# Patient Record
Sex: FEMALE | Race: WHITE | NOT HISPANIC OR LATINO | Employment: FULL TIME | URBAN - METROPOLITAN AREA
[De-identification: names, ages, dates, MRNs, and addresses within clinical notes are randomized per-mention and may not be internally consistent; named-entity substitution may affect disease eponyms.]

---

## 2017-06-12 ENCOUNTER — COMMUNICATION - HEALTHEAST (OUTPATIENT)
Dept: FAMILY MEDICINE | Facility: CLINIC | Age: 37
End: 2017-06-12

## 2017-06-12 ENCOUNTER — AMBULATORY - HEALTHEAST (OUTPATIENT)
Dept: FAMILY MEDICINE | Facility: CLINIC | Age: 37
End: 2017-06-12

## 2017-06-12 ENCOUNTER — OFFICE VISIT - HEALTHEAST (OUTPATIENT)
Dept: FAMILY MEDICINE | Facility: CLINIC | Age: 37
End: 2017-06-12

## 2017-06-12 DIAGNOSIS — F32.1 MAJOR DEPRESSIVE DISORDER, SINGLE EPISODE, MODERATE (H): ICD-10-CM

## 2017-06-12 DIAGNOSIS — N76.0 VAGINITIS: ICD-10-CM

## 2017-06-12 DIAGNOSIS — Z72.0 TOBACCO ABUSE: ICD-10-CM

## 2018-03-12 ENCOUNTER — COMMUNICATION - HEALTHEAST (OUTPATIENT)
Dept: FAMILY MEDICINE | Facility: CLINIC | Age: 38
End: 2018-03-12

## 2018-05-26 ENCOUNTER — COMMUNICATION - HEALTHEAST (OUTPATIENT)
Dept: FAMILY MEDICINE | Facility: CLINIC | Age: 38
End: 2018-05-26

## 2018-05-26 DIAGNOSIS — F32.1 MAJOR DEPRESSIVE DISORDER, SINGLE EPISODE, MODERATE (H): ICD-10-CM

## 2018-06-14 ENCOUNTER — COMMUNICATION - HEALTHEAST (OUTPATIENT)
Dept: FAMILY MEDICINE | Facility: CLINIC | Age: 38
End: 2018-06-14

## 2018-08-02 ENCOUNTER — OFFICE VISIT - HEALTHEAST (OUTPATIENT)
Dept: FAMILY MEDICINE | Facility: CLINIC | Age: 38
End: 2018-08-02

## 2018-08-02 ENCOUNTER — COMMUNICATION - HEALTHEAST (OUTPATIENT)
Dept: FAMILY MEDICINE | Facility: CLINIC | Age: 38
End: 2018-08-02

## 2018-08-02 DIAGNOSIS — Z30.8 ENCOUNTER FOR OTHER CONTRACEPTIVE MANAGEMENT: ICD-10-CM

## 2018-08-02 DIAGNOSIS — Z72.0 TOBACCO ABUSE: ICD-10-CM

## 2018-08-02 ASSESSMENT — MIFFLIN-ST. JEOR: SCORE: 1411.62

## 2018-08-21 ENCOUNTER — RECORDS - HEALTHEAST (OUTPATIENT)
Dept: ADMINISTRATIVE | Facility: OTHER | Age: 38
End: 2018-08-21

## 2018-08-21 ENCOUNTER — OFFICE VISIT - HEALTHEAST (OUTPATIENT)
Dept: OBGYN | Facility: CLINIC | Age: 38
End: 2018-08-21

## 2018-08-21 DIAGNOSIS — Z30.09 STERILIZATION CONSULT: ICD-10-CM

## 2018-08-21 ASSESSMENT — MIFFLIN-ST. JEOR: SCORE: 1401.64

## 2018-09-08 ENCOUNTER — COMMUNICATION - HEALTHEAST (OUTPATIENT)
Dept: FAMILY MEDICINE | Facility: CLINIC | Age: 38
End: 2018-09-08

## 2018-09-08 DIAGNOSIS — Z72.0 TOBACCO ABUSE: ICD-10-CM

## 2018-09-08 DIAGNOSIS — F32.1 MAJOR DEPRESSIVE DISORDER, SINGLE EPISODE, MODERATE (H): ICD-10-CM

## 2018-09-24 ENCOUNTER — OFFICE VISIT - HEALTHEAST (OUTPATIENT)
Dept: FAMILY MEDICINE | Facility: CLINIC | Age: 38
End: 2018-09-24

## 2018-09-24 DIAGNOSIS — Z30.2 ENCOUNTER FOR STERILIZATION: ICD-10-CM

## 2018-09-24 DIAGNOSIS — Z01.818 PRE-OP EXAMINATION: ICD-10-CM

## 2018-09-24 DIAGNOSIS — F32.1 MODERATE MAJOR DEPRESSION (H): ICD-10-CM

## 2018-09-24 DIAGNOSIS — S61.459A HUMAN BITE OF HAND: ICD-10-CM

## 2018-09-24 DIAGNOSIS — Z72.0 TOBACCO ABUSE: ICD-10-CM
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
## 2018-09-24 LAB
HGB BLD-MCNC: 15.4 G/DL (ref 12–16)
TSH SERPL DL<=0.005 MIU/L-ACNC: 1.52 UIU/ML (ref 0.3–5)

## 2018-09-24 ASSESSMENT — MIFFLIN-ST. JEOR: SCORE: 1428.85

## 2018-09-26 ASSESSMENT — MIFFLIN-ST. JEOR: SCORE: 1428.85

## 2018-09-27 ENCOUNTER — ANESTHESIA - HEALTHEAST (OUTPATIENT)
Dept: SURGERY | Facility: AMBULATORY SURGERY CENTER | Age: 38
End: 2018-09-27

## 2018-09-28 ENCOUNTER — AMBULATORY - HEALTHEAST (OUTPATIENT)
Dept: OBGYN | Facility: CLINIC | Age: 38
End: 2018-09-28

## 2018-09-28 ENCOUNTER — SURGERY - HEALTHEAST (OUTPATIENT)
Dept: SURGERY | Facility: AMBULATORY SURGERY CENTER | Age: 38
End: 2018-09-28

## 2018-09-28 DIAGNOSIS — G89.18 ACUTE POST-OPERATIVE PAIN: ICD-10-CM

## 2018-09-28 ASSESSMENT — MIFFLIN-ST. JEOR: SCORE: 1428.85

## 2019-01-14 ENCOUNTER — COMMUNICATION - HEALTHEAST (OUTPATIENT)
Dept: FAMILY MEDICINE | Facility: CLINIC | Age: 39
End: 2019-01-14

## 2019-03-11 ENCOUNTER — RECORDS - HEALTHEAST (OUTPATIENT)
Dept: ADMINISTRATIVE | Facility: OTHER | Age: 39
End: 2019-03-11

## 2019-05-07 ENCOUNTER — COMMUNICATION - HEALTHEAST (OUTPATIENT)
Dept: SCHEDULING | Facility: CLINIC | Age: 39
End: 2019-05-07

## 2019-05-07 ENCOUNTER — RECORDS - HEALTHEAST (OUTPATIENT)
Dept: GENERAL RADIOLOGY | Facility: CLINIC | Age: 39
End: 2019-05-07

## 2019-05-07 ENCOUNTER — OFFICE VISIT - HEALTHEAST (OUTPATIENT)
Dept: FAMILY MEDICINE | Facility: CLINIC | Age: 39
End: 2019-05-07

## 2019-05-07 DIAGNOSIS — S99.911A UNSPECIFIED INJURY OF RIGHT ANKLE, INITIAL ENCOUNTER: ICD-10-CM

## 2019-05-07 DIAGNOSIS — S99.911A ANKLE INJURY, RIGHT, INITIAL ENCOUNTER: ICD-10-CM

## 2019-05-08 ENCOUNTER — COMMUNICATION - HEALTHEAST (OUTPATIENT)
Dept: FAMILY MEDICINE | Facility: CLINIC | Age: 39
End: 2019-05-08

## 2019-07-27 ENCOUNTER — COMMUNICATION - HEALTHEAST (OUTPATIENT)
Dept: FAMILY MEDICINE | Facility: CLINIC | Age: 39
End: 2019-07-27

## 2019-07-27 DIAGNOSIS — F32.1 MODERATE MAJOR DEPRESSION (H): ICD-10-CM

## 2019-09-05 ENCOUNTER — RECORDS - HEALTHEAST (OUTPATIENT)
Dept: ADMINISTRATIVE | Facility: OTHER | Age: 39
End: 2019-09-05

## 2019-09-12 ENCOUNTER — RECORDS - HEALTHEAST (OUTPATIENT)
Dept: ADMINISTRATIVE | Facility: OTHER | Age: 39
End: 2019-09-12

## 2019-09-13 ENCOUNTER — COMMUNICATION - HEALTHEAST (OUTPATIENT)
Dept: FAMILY MEDICINE | Facility: CLINIC | Age: 39
End: 2019-09-13

## 2019-10-22 ENCOUNTER — AMBULATORY - HEALTHEAST (OUTPATIENT)
Dept: FAMILY MEDICINE | Facility: CLINIC | Age: 39
End: 2019-10-22

## 2019-10-22 ENCOUNTER — COMMUNICATION - HEALTHEAST (OUTPATIENT)
Dept: FAMILY MEDICINE | Facility: CLINIC | Age: 39
End: 2019-10-22

## 2019-10-22 ENCOUNTER — RECORDS - HEALTHEAST (OUTPATIENT)
Dept: SCHEDULING | Facility: CLINIC | Age: 39
End: 2019-10-22

## 2019-10-22 DIAGNOSIS — Z28.39 INCOMPLETE IMMUNIZATION STATUS: ICD-10-CM

## 2020-02-24 ENCOUNTER — OFFICE VISIT - HEALTHEAST (OUTPATIENT)
Dept: FAMILY MEDICINE | Facility: CLINIC | Age: 40
End: 2020-02-24

## 2020-02-24 DIAGNOSIS — Z00.00 ENCOUNTER FOR GENERAL ADULT MEDICAL EXAMINATION WITHOUT ABNORMAL FINDINGS: ICD-10-CM

## 2020-02-24 DIAGNOSIS — I10 BENIGN ESSENTIAL HYPERTENSION: ICD-10-CM

## 2020-02-24 DIAGNOSIS — F32.1 MODERATE MAJOR DEPRESSION (H): ICD-10-CM

## 2020-02-24 DIAGNOSIS — Z13.220 ENCOUNTER FOR SCREENING FOR LIPOID DISORDERS: ICD-10-CM

## 2020-02-24 LAB
ALBUMIN SERPL-MCNC: 4.2 G/DL (ref 3.5–5)
ALP SERPL-CCNC: 78 U/L (ref 45–120)
ALT SERPL W P-5'-P-CCNC: 16 U/L (ref 0–45)
ANION GAP SERPL CALCULATED.3IONS-SCNC: 12 MMOL/L (ref 5–18)
AST SERPL W P-5'-P-CCNC: 16 U/L (ref 0–40)
BILIRUB SERPL-MCNC: 0.5 MG/DL (ref 0–1)
BUN SERPL-MCNC: 9 MG/DL (ref 8–22)
CALCIUM SERPL-MCNC: 9.4 MG/DL (ref 8.5–10.5)
CHLORIDE BLD-SCNC: 103 MMOL/L (ref 98–107)
CHOLEST SERPL-MCNC: 200 MG/DL
CO2 SERPL-SCNC: 24 MMOL/L (ref 22–31)
CREAT SERPL-MCNC: 0.75 MG/DL (ref 0.6–1.1)
FASTING STATUS PATIENT QL REPORTED: YES
GFR SERPL CREATININE-BSD FRML MDRD: >60 ML/MIN/1.73M2
GLUCOSE BLD-MCNC: 80 MG/DL (ref 70–125)
HDLC SERPL-MCNC: 72 MG/DL
HGB BLD-MCNC: 14.8 G/DL (ref 12–16)
LDLC SERPL CALC-MCNC: 115 MG/DL
POTASSIUM BLD-SCNC: 4 MMOL/L (ref 3.5–5)
PROT SERPL-MCNC: 7.5 G/DL (ref 6–8)
SODIUM SERPL-SCNC: 139 MMOL/L (ref 136–145)
TRIGL SERPL-MCNC: 66 MG/DL
TSH SERPL DL<=0.005 MIU/L-ACNC: 1.91 UIU/ML (ref 0.3–5)

## 2020-02-24 RX ORDER — LISINOPRIL 10 MG/1
10 TABLET ORAL DAILY
Qty: 90 TABLET | Refills: 3 | Status: SHIPPED | OUTPATIENT
Start: 2020-02-24 | End: 2021-12-07

## 2020-02-24 ASSESSMENT — ANXIETY QUESTIONNAIRES
3. WORRYING TOO MUCH ABOUT DIFFERENT THINGS: NEARLY EVERY DAY
2. NOT BEING ABLE TO STOP OR CONTROL WORRYING: MORE THAN HALF THE DAYS
5. BEING SO RESTLESS THAT IT IS HARD TO SIT STILL: MORE THAN HALF THE DAYS
4. TROUBLE RELAXING: NEARLY EVERY DAY
6. BECOMING EASILY ANNOYED OR IRRITABLE: NEARLY EVERY DAY
IF YOU CHECKED OFF ANY PROBLEMS ON THIS QUESTIONNAIRE, HOW DIFFICULT HAVE THESE PROBLEMS MADE IT FOR YOU TO DO YOUR WORK, TAKE CARE OF THINGS AT HOME, OR GET ALONG WITH OTHER PEOPLE: EXTREMELY DIFFICULT
GAD7 TOTAL SCORE: 18
7. FEELING AFRAID AS IF SOMETHING AWFUL MIGHT HAPPEN: MORE THAN HALF THE DAYS
1. FEELING NERVOUS, ANXIOUS, OR ON EDGE: NEARLY EVERY DAY

## 2020-02-24 ASSESSMENT — MIFFLIN-ST. JEOR: SCORE: 1460.03

## 2020-02-24 ASSESSMENT — PATIENT HEALTH QUESTIONNAIRE - PHQ9: SUM OF ALL RESPONSES TO PHQ QUESTIONS 1-9: 24

## 2020-02-25 LAB
25(OH)D3 SERPL-MCNC: 31.3 NG/ML (ref 30–80)
25(OH)D3 SERPL-MCNC: 31.3 NG/ML (ref 30–80)

## 2020-02-26 LAB
BKR LAB AP ABNORMAL BLEEDING: NO
BKR LAB AP BIRTH CONTROL/HORMONES: NORMAL
BKR LAB AP CERVICAL APPEARANCE: NORMAL
BKR LAB AP GYN ADEQUACY: NORMAL
BKR LAB AP GYN INTERPRETATION: NORMAL
BKR LAB AP GYN OTHER FINDINGS: NORMAL
BKR LAB AP HPV REFLEX: NORMAL
BKR LAB AP LMP: NORMAL
BKR LAB AP PATIENT STATUS: NORMAL
BKR LAB AP PREVIOUS ABNORMAL: NORMAL
BKR LAB AP PREVIOUS NORMAL: NORMAL
HIGH RISK?: NO
PATH REPORT.COMMENTS IMP SPEC: NORMAL
RESULT FLAG (HE HISTORICAL CONVERSION): NORMAL

## 2020-03-18 ENCOUNTER — COMMUNICATION - HEALTHEAST (OUTPATIENT)
Dept: FAMILY MEDICINE | Facility: CLINIC | Age: 40
End: 2020-03-18

## 2020-03-25 ENCOUNTER — COMMUNICATION - HEALTHEAST (OUTPATIENT)
Dept: INTERNAL MEDICINE | Facility: CLINIC | Age: 40
End: 2020-03-25

## 2020-03-25 DIAGNOSIS — F32.1 MODERATE MAJOR DEPRESSION (H): ICD-10-CM

## 2020-03-27 ENCOUNTER — RECORDS - HEALTHEAST (OUTPATIENT)
Dept: ADMINISTRATIVE | Facility: OTHER | Age: 40
End: 2020-03-27

## 2020-03-30 ENCOUNTER — OFFICE VISIT - HEALTHEAST (OUTPATIENT)
Dept: FAMILY MEDICINE | Facility: CLINIC | Age: 40
End: 2020-03-30

## 2020-03-30 DIAGNOSIS — F32.1 MODERATE MAJOR DEPRESSION (H): ICD-10-CM

## 2020-03-30 DIAGNOSIS — F33.1 MODERATE EPISODE OF RECURRENT MAJOR DEPRESSIVE DISORDER (H): ICD-10-CM

## 2020-03-30 DIAGNOSIS — I10 BENIGN ESSENTIAL HYPERTENSION: ICD-10-CM

## 2020-03-30 ASSESSMENT — ANXIETY QUESTIONNAIRES
5. BEING SO RESTLESS THAT IT IS HARD TO SIT STILL: NOT AT ALL
2. NOT BEING ABLE TO STOP OR CONTROL WORRYING: NOT AT ALL
3. WORRYING TOO MUCH ABOUT DIFFERENT THINGS: SEVERAL DAYS
6. BECOMING EASILY ANNOYED OR IRRITABLE: NOT AT ALL
GAD7 TOTAL SCORE: 3
7. FEELING AFRAID AS IF SOMETHING AWFUL MIGHT HAPPEN: SEVERAL DAYS
IF YOU CHECKED OFF ANY PROBLEMS ON THIS QUESTIONNAIRE, HOW DIFFICULT HAVE THESE PROBLEMS MADE IT FOR YOU TO DO YOUR WORK, TAKE CARE OF THINGS AT HOME, OR GET ALONG WITH OTHER PEOPLE: SOMEWHAT DIFFICULT
1. FEELING NERVOUS, ANXIOUS, OR ON EDGE: SEVERAL DAYS
4. TROUBLE RELAXING: NOT AT ALL

## 2020-03-30 ASSESSMENT — PATIENT HEALTH QUESTIONNAIRE - PHQ9: SUM OF ALL RESPONSES TO PHQ QUESTIONS 1-9: 4

## 2020-05-02 ENCOUNTER — COMMUNICATION - HEALTHEAST (OUTPATIENT)
Dept: FAMILY MEDICINE | Facility: CLINIC | Age: 40
End: 2020-05-02

## 2020-05-02 DIAGNOSIS — F32.1 MODERATE MAJOR DEPRESSION (H): ICD-10-CM

## 2021-02-25 ENCOUNTER — OFFICE VISIT - HEALTHEAST (OUTPATIENT)
Dept: FAMILY MEDICINE | Facility: CLINIC | Age: 41
End: 2021-02-25

## 2021-02-25 DIAGNOSIS — R35.0 URINARY FREQUENCY: ICD-10-CM

## 2021-02-25 LAB
ALBUMIN UR-MCNC: NEGATIVE MG/DL
APPEARANCE UR: CLEAR
BACTERIA #/AREA URNS HPF: ABNORMAL HPF
BILIRUB UR QL STRIP: NEGATIVE
COLOR UR AUTO: YELLOW
GLUCOSE UR STRIP-MCNC: NEGATIVE MG/DL
HGB UR QL STRIP: ABNORMAL
KETONES UR STRIP-MCNC: NEGATIVE MG/DL
LEUKOCYTE ESTERASE UR QL STRIP: ABNORMAL
NITRATE UR QL: NEGATIVE
PH UR STRIP: 5.5 [PH] (ref 5–8)
RBC #/AREA URNS AUTO: ABNORMAL HPF
SP GR UR STRIP: 1.02 (ref 1–1.03)
SQUAMOUS #/AREA URNS AUTO: ABNORMAL LPF
UROBILINOGEN UR STRIP-ACNC: ABNORMAL
WBC #/AREA URNS AUTO: ABNORMAL HPF
WBC CLUMPS #/AREA URNS HPF: PRESENT /[HPF]

## 2021-02-27 LAB — BACTERIA SPEC CULT: ABNORMAL

## 2021-03-01 ENCOUNTER — COMMUNICATION - HEALTHEAST (OUTPATIENT)
Dept: FAMILY MEDICINE | Facility: CLINIC | Age: 41
End: 2021-03-01

## 2021-04-07 ENCOUNTER — COMMUNICATION - HEALTHEAST (OUTPATIENT)
Dept: SCHEDULING | Facility: CLINIC | Age: 41
End: 2021-04-07

## 2021-05-27 ASSESSMENT — PATIENT HEALTH QUESTIONNAIRE - PHQ9
SUM OF ALL RESPONSES TO PHQ QUESTIONS 1-9: 24
SUM OF ALL RESPONSES TO PHQ QUESTIONS 1-9: 4

## 2021-05-28 ASSESSMENT — ANXIETY QUESTIONNAIRES
GAD7 TOTAL SCORE: 3
GAD7 TOTAL SCORE: 18

## 2021-05-28 NOTE — PROGRESS NOTES
Assessment:   1. Ankle injury, right, initial encounter  Likely secondary to  Ankle sprain of the right lateral malleolus   - XR Ankle Right 3 or More VWS; Future    Plan:   I discussed the following treatment options: Exercise options discussed and encouraged  Activity modifications discussed and recommended  Continue with existing conservative treatment program  Weight bearing restriction: None  Questions solicited and answered  ACE rap applied.      Subjective:    Elisabeth Carranza is a 39 y.o. female presents for evaluation and treatment of an injury to the right ankle. This is evaluated as a personal injury. The injury occurred 1 day ago, and occurred while playing softball.  The patient states the ankle rolled inward at the time of injury.  She did not hear or sense a pop or snap at the time of the injury. The patient notes pain and moderate swelling of the ankle since the injury. She has treated the ankle with ice. Pain is localized to the lateral  malleolar area. She has not sprained this ankle in the past.    Outside reports reviewed: None.    The following portions of the patient's history were reviewed and updated as appropriate: allergies, current medications, past family history, past medical history, past social history, past surgical history and problem list.      Objective:      General :    alert, appears stated age and cooperative   Gait:  Normal. The patient can bear weight on the injured extremity.   Right Ankle  Proximal Fibula:   no tenderness noted   Edema:   moderate swelling of the medial, lateral surface   Ecchymosis:   is not observed    Active ROM:  50% of normal    Passive ROM:   50% of normal    Palpation:  moderate tenderness of the anterior, medial surface   Stability.:   no joint laxity. Drawer sign equal to unaffected ankle.   Sensation:    intact to light touch   Pulses:  normal DP and PT pulses     Imaging  X-ray of the ankle/foot: 3 views of the ankle reveal a stable mortise joint,  moderate edema and no evidence of fracture.  I have independently review and interpreted the  imaging, pending the final radiology read.

## 2021-05-28 NOTE — TELEPHONE ENCOUNTER
CC: R ankle pain      > Playing softball last night - fell over 1st base     > No pop/snap heard     > yes ankle swelling   > Is able to walk but painful to do so        At home   ICE and IBU and ankle brace       A/P:   > Appt for today     > Rec RICE per guidelines - at night,  suitcase between mattress and box spring        Olegario Guzman, RN   Triage and Medication Refills          Reason for Disposition    SEVERE pain (e.g., excruciating)    Protocols used: ANKLE AND FOOT INJURY-A-OH

## 2021-05-30 ENCOUNTER — RECORDS - HEALTHEAST (OUTPATIENT)
Dept: ADMINISTRATIVE | Facility: CLINIC | Age: 41
End: 2021-05-30

## 2021-05-30 NOTE — TELEPHONE ENCOUNTER
Refill Given    Refill given per Policy, patient informed they are overdue for Labs   OV 9/24/18    Radha Shearer, [facility] Triage/Med Refill 7/27/2019    Requested Prescriptions   Pending Prescriptions Disp Refills     venlafaxine (EFFEXOR-XR) 75 MG 24 hr capsule [Pharmacy Med Name: VENLAFAXINE HCL ER 75MG CP24] 90 capsule 1     Sig: TAKE ONE CAPSULE BY MOUTH EVERY DAY       Venlafaxine/Desvenlafaxine Refill Protocol Failed - 7/27/2019  8:40 PM        Failed - LFT or AST or ALT in last year     Albumin   Date Value Ref Range Status   06/20/2011 4.0 3.5 - 5.0 g/dL Final     Bilirubin, Total   Date Value Ref Range Status   06/20/2011 0.2 <1.1 mg/dL Final     Alkaline Phosphatase   Date Value Ref Range Status   06/20/2011 118 45 - 120 IU/L Final     AST   Date Value Ref Range Status   06/20/2011 15 <41 IU/L Final     ALT   Date Value Ref Range Status   06/20/2011 13 <46 IU/L Final     Protein, Total   Date Value Ref Range Status   06/20/2011 7.4 6.0 - 8.0 g/dL Final                Failed - Fasting lipid cascade in last year     Cholesterol   Date Value Ref Range Status   10/21/2016 158 <=199 mg/dL Final     Triglycerides   Date Value Ref Range Status   10/21/2016 63 <=149 mg/dL Final     HDL Cholesterol   Date Value Ref Range Status   10/21/2016 52 >=50 mg/dL Final     LDL Calculated   Date Value Ref Range Status   10/21/2016 93 <=129 mg/dL Final     Patient Fasting > 8hrs?   Date Value Ref Range Status   10/21/2016 Yes  Final             Passed - PCP or prescribing provider visit in last year     Last office visit with prescriber/PCP: 6/12/2017 Elisabeth Arita MD OR same dept: 8/2/2018 Becca Terry MD OR same specialty: 5/7/2019 Leena Dawson, DICK  Last physical: 9/24/2018 Last MTM visit: Visit date not found   Next visit within 3 mo: Visit date not found  Next physical within 3 mo: Visit date not found  Prescriber OR PCP: Elisabeth Arita MD  Last diagnosis associated with med order: There are no  diagnoses linked to this encounter.  If protocol passes may refill for 12 months if within 3 months of last provider visit (or a total of 15 months).             Passed - Blood Pressure in last year     BP Readings from Last 1 Encounters:   05/07/19 133/78

## 2021-05-31 VITALS — BODY MASS INDEX: 30.66 KG/M2 | WEIGHT: 173.1 LBS

## 2021-06-01 VITALS — WEIGHT: 167 LBS | HEIGHT: 63 IN | BODY MASS INDEX: 29.59 KG/M2

## 2021-06-01 VITALS — BODY MASS INDEX: 29.98 KG/M2 | WEIGHT: 169.2 LBS | HEIGHT: 63 IN

## 2021-06-02 ENCOUNTER — RECORDS - HEALTHEAST (OUTPATIENT)
Dept: ADMINISTRATIVE | Facility: CLINIC | Age: 41
End: 2021-06-02

## 2021-06-02 VITALS — HEIGHT: 63 IN | WEIGHT: 173 LBS | BODY MASS INDEX: 30.65 KG/M2

## 2021-06-02 NOTE — TELEPHONE ENCOUNTER
Left message to call back for: MMR  Information to relay to patient:  LMTCB. Dr. Mcgarry has ordered both Rubeola titer and MMR vaccine.  Please schedule lab appt if patient would like MMR titer done or nurse appt if she would just like to have MMR vaccine.

## 2021-06-02 NOTE — TELEPHONE ENCOUNTER
Patient called back to state that she was coming in for an MMR vaccination.  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  Spoke with assistant at [facility]. Patient's PCP is no longer with [facility] and patient has not been seen for 1 year at [facility]. A doctor needs to sign off on the order.  Clinic will have Dr. Mcgarry review. Patient may need a titer first.  Clinic will call patient back.  She can be reached at [phone] detailed message can be left.  Thank you.  Olga Mays, RN  [facility] Triage Nurse  3:00 PM  10/22/2019

## 2021-06-02 NOTE — TELEPHONE ENCOUNTER
Called and left a message to call back. Patient is coming in today for an MMR and does not have orders- immunization or blood draw. I am wondering why patient is requesting MMR now. Usually providers check MMR status with a titer (blood draw).

## 2021-06-03 VITALS — BODY MASS INDEX: 30.65 KG/M2 | WEIGHT: 173 LBS

## 2021-06-04 VITALS
DIASTOLIC BLOOD PRESSURE: 96 MMHG | WEIGHT: 176.38 LBS | BODY MASS INDEX: 30.11 KG/M2 | OXYGEN SATURATION: 99 % | SYSTOLIC BLOOD PRESSURE: 144 MMHG | HEIGHT: 64 IN | HEART RATE: 81 BPM

## 2021-06-05 ENCOUNTER — RECORDS - HEALTHEAST (OUTPATIENT)
Dept: ULTRASOUND IMAGING | Facility: CLINIC | Age: 41
End: 2021-06-05

## 2021-06-05 VITALS
SYSTOLIC BLOOD PRESSURE: 128 MMHG | BODY MASS INDEX: 34.08 KG/M2 | WEIGHT: 198.56 LBS | OXYGEN SATURATION: 99 % | DIASTOLIC BLOOD PRESSURE: 91 MMHG | HEART RATE: 97 BPM

## 2021-06-05 DIAGNOSIS — Z33.1 PREGNANT STATE, INCIDENTAL: ICD-10-CM

## 2021-06-05 DIAGNOSIS — Z36.89 ENCOUNTER FOR FETAL ANATOMIC SURVEY: ICD-10-CM

## 2021-06-06 NOTE — TELEPHONE ENCOUNTER
LM for patient to return call to clinic.  Per [facility] ([facility]) guidelines, due to Covid-19, it is recommended that the patient not come into clinic unless it is an urgent need.  It is recommended to schedule a telephone visit with the provider.  Please assist in setting up the telephone appointment or reschedule in clinic after July 6, 2020. Thank you

## 2021-06-07 NOTE — TELEPHONE ENCOUNTER
Refill Approved    Rx renewed per Medication Renewal Policy. Medication was last renewed on 3/30/20.    Dariela Stockton, [facility] Triage/Med Refill 5/4/2020     Requested Prescriptions   Pending Prescriptions Disp Refills     venlafaxine (EFFEXOR-XR) 150 MG 24 hr capsule 90 capsule 0     Sig: Take 1 capsule (150 mg total) by mouth daily.       Venlafaxine/Desvenlafaxine Refill Protocol Passed - 5/2/2020 12:33 PM        Passed - LFT or AST or ALT in last year     Albumin   Date Value Ref Range Status   02/24/2020 4.2 3.5 - 5.0 g/dL Final     Bilirubin, Total   Date Value Ref Range Status   02/24/2020 0.5 0.0 - 1.0 mg/dL Final     Alkaline Phosphatase   Date Value Ref Range Status   02/24/2020 78 45 - 120 U/L Final     AST   Date Value Ref Range Status   02/24/2020 16 0 - 40 U/L Final     ALT   Date Value Ref Range Status   02/24/2020 16 0 - 45 U/L Final     Protein, Total   Date Value Ref Range Status   02/24/2020 7.5 6.0 - 8.0 g/dL Final                Passed - Fasting lipid cascade in last year     Cholesterol   Date Value Ref Range Status   02/24/2020 200 (H) <=199 mg/dL Final     Triglycerides   Date Value Ref Range Status   02/24/2020 66 <=149 mg/dL Final     HDL Cholesterol   Date Value Ref Range Status   02/24/2020 72 >=50 mg/dL Final     LDL Calculated   Date Value Ref Range Status   02/24/2020 115 <=129 mg/dL Final     Patient Fasting > 8hrs?   Date Value Ref Range Status   02/24/2020 Yes  Final             Passed - PCP or prescribing provider visit in last year     Last office visit with prescriber/PCP: 5/7/2019 Leena Dawson FNP OR same dept: 5/7/2019 Leena Dawson FNP OR same specialty: 5/7/2019 Leena Dawson FNP  Last physical: 2/24/2020 Last MTM visit: Visit date not found   Next visit within 3 mo: Visit date not found  Next physical within 3 mo: Visit date not found  Prescriber OR PCP: RADHA Moyer  Last diagnosis associated with med order: 1. Moderate major depression  (H)  - venlafaxine (EFFEXOR-XR) 150 MG 24 hr capsule; Take 1 capsule (150 mg total) by mouth daily.  Dispense: 90 capsule; Refill: 0    If protocol passes may refill for 12 months if within 3 months of last provider visit (or a total of 15 months).             Passed - Blood Pressure in last year     BP Readings from Last 1 Encounters:   02/24/20 (!) 144/96

## 2021-06-07 NOTE — PROGRESS NOTES
"Emily Vidales is a 40 y.o. female who is being evaluated via a billable telephone visit.      The patient has been notified of following:     \"This telephone visit will be conducted via a call between you and your physician/provider. We have found that certain health care needs can be provided without the need for a physical exam.  This service lets us provide the care you need with a short phone conversation.  If a prescription is necessary we can send it directly to your pharmacy.  If lab work is needed we can place an order for that and you can then stop by our lab to have the test done at a later time.    If during the course of the call the physician/provider feels a telephone visit is not appropriate, you will not be charged for this service.\"     Patient has given verbal consent to a Telephone visit? Yes    Emily Vidales complains of  No chief complaint on file.      I have reviewed and updated the patient's Past Medical History, Social History, Family History and Medication List.    ALLERGIES  Patient has no known allergies.    Additional provider notes:     Subjective:   Emily Vidales is a 40 y.o. female whom I evaluated for follow up of depression and hypertension. Patient reports that she is in a better place now and she can focus better since starting her medication. She reports that things are going well. Symptoms have been rapidly improving since that time. Patient denies insomnia, psychomotor agitation, psychomotor retardation, recurrent thoughts of death, suicidal attempt, suicidal thoughts with specific plan, suicidal thoughts without plan, weight gain and weight loss. Previous treatment includes: medication. She complains of the following side effects from the treatment: none. Patient also reports that her blood pressure has normalized since starting lisinopril. She denied any adverse effect from the medication.     The following portions of the patient's history were reviewed and updated as " appropriate: allergies, current medications, past family history, past medical history, past social history, past surgical history and problem list.    Assessment/Plan:  1. Benign essential hypertension  Blood pressure is well controlled and meeting goal of <140/90 mm Hg per JNC-8 hypertension guidelines.    Continue with lisinopril 10 mg daily.     2. Moderate episode of recurrent major depressive disorder (H)  Improved symptom of depression and anxiety.   Medications: Effexor.  Reviewed concept of anxiety as biochemical imbalance of neurotransmitters and rationale for treatment.  Instructed patient to contact office or on-call physician promptly should condition worsen or any new symptoms appear and provided on-call telephone numbers. IF THE PATIENT HAS ANY SUICIDAL OR HOMICIDAL IDEATIONS, CALL THE OFFICE, DISCUSS WITH A SUPPORT MEMBER, OR GO TO THE ER IMMEDIATELY. Patient was agreeable with this plan.  Follow up: 4 weeks.  Spent 15 minutes (>50% of visit) discussing the risks of depression, the  pathophysiology, etiology, risks, and principles of treatment.    Phone call duration:  15 minutes    RADHA Moyer     Breath sounds clear and equal bilaterally.

## 2021-06-11 NOTE — PROGRESS NOTES
ASSESSMENT/PLAN  1. Vaginitis  Reviewed with patient that we will contact her regarding wet prep results when available.  Anticipate may see bacterial vaginosis and/or yeast--- if confirmed, will send appropriate Rx to pharmacy.  Reviewed option of additionally trying over-the-counter product such as Summer's Sandra feminine wash if desired.  Patient counseled to avoid any use of a douche or similar product.  - Wet Prep, Vaginal    2. Major depressive disorder, single episode, moderate  Poorly controlled depression without suicidality.  Patient is seeking counseling and I did give her the names of a couple counselor she could pursue including Jason Barnett.  She does not think she needs a referral.  At this time, we will increase from 20-->40 mg of citalopram and she will update me via my chart in approximately 3 weeks.  If this is not improving symptoms, would recommend that we do a formal consult for evaluation of bipolar given her description of mood changes.  She is in agreement.  - citalopram (CELEXA) 40 MG tablet; TAKE 1 TABLET BY MOUTH DAILY  Dispense: 90 tablet; Refill: 1    3. Tobacco abuse  Patient continues to smoke and we discussed the importance of cessation.  She is aware of the health risks but feels unprepared to quit at this time with depression symptoms uncontrolled.    The following are part of a depression follow up plan for the patient:  seen in mental health clinic and patient follow-up to return when and if necessary  The following high BMI interventions were performed this visit: encouragement to exercise and weight monitoring----reviewed importance of appropriate food choices and portion control as well.    Pt states an understanding and agrees to the above plan.          SUBJECTIVE:   Chief Complaint   Patient presents with     Vaginitis     vaginal fishy odor with unusal discharge for about 6 months-      Follow-up     PHQ-9  Celexa current dose not working     Elisabeth Carranza 37 y.o.  "female    Current Outpatient Prescriptions   Medication Sig Dispense Refill     citalopram (CELEXA) 40 MG tablet TAKE 1 TABLET BY MOUTH DAILY 90 tablet 1     MV/FA/DHA/EPA/COQ10/CA/D3/CRAN (WOMEN'S VITAPAK ORAL) Take by mouth.       No current facility-administered medications for this visit.      Allergies: Review of patient's allergies indicates no known allergies.   Patient's last menstrual period was 05/15/2017.    HPI:   Emily is a 37-year-old female comes in today for concern regarding vaginal infection.  She has had symptoms for approximately 6 months now of increasing discharge with a \"fishy\" odor.  She states she is sexually active but only with her .  They have been having significant marital stressors and are not sexually active frequently at this point.  She is not concerned about STI.  She denies any significant itching or irritation.  There is no dysuria or urinary frequency.  No dyspareunia.  Review of chart shows normal Pap with negative HPV in 2 2014.  Of note, there was evidence for shift in caleb consistent with bacterial vaginosis on the Pap.  Emliy has a history of depression.  She is on citalopram at 20 mg.  She just refilled her prescription but feels that it is not working.  She struggles with fluctuating mood and states she will have several days where she will force herself to do things with the kids work etc. but would prefer to just go to bed and sleep.  This will then lifted she will have days with better energy.  She does not describe being up all night, problematic spending or drinking etc.  She does feel she becomes easily tearful and emotional.  There is no significant family history for depression anxiety or other mental health issues--- she specifically denies any history of bipolar but admits she is thought about it.  She reports compliance with her current medication and no side effects.  She does continue smoking and admits she does this more when she feels stressed.  She " denies any other illicit drug use.    ROS: negative except as per HPI    OBJECTIVE:   The patient appears well, alert, oriented x 3, in no distress.  /80  Pulse 60  Wt 173 lb 1.6 oz (78.5 kg)  LMP 05/15/2017  Breastfeeding? No  BMI 30.66 kg/m2    : Normal external female exam.  Speculum isolate cervix with no gross abnormalities but large amount of thick mucus--- yellow/white--in the vaginal vault.  No obvious odor.  Bimanual exam shows no cervical motion tenderness or adnexal tenderness or masses.    Little interest or pleasure in doing things: More than half the days  Feeling down, depressed, or hopeless: More than half the days  Trouble falling or staying asleep, or sleeping too much: Nearly every day  Feeling tired or having little energy: Nearly every day  Poor appetite or overeating: Nearly every day  Feeling bad about yourself - or that you are a failure or have let yourself or your family down: Nearly every day  Trouble concentrating on things, such as reading the newspaper or watching television: More than half the days  Moving or speaking so slowly that other people could have noticed. Or the opposite - being so fidgety or restless that you have been moving around a lot more than usual: More than half the days  Thoughts that you would be better off dead, or of hurting yourself in some way: Not at all  PHQ-9 Total Score: 20  If you checked off any problems, how difficult have these problems made it for you to do your work, take care of things at home, or get along with other people?: Very difficult

## 2021-06-15 NOTE — PROGRESS NOTES
Assessment:   1. Urinary frequency  Discussed diagnosis of urinary tract infection and treatment. Recommend a different antibiotic from the last one and obtain urine culture.   - Urinalysis-UC if Indicated  - Culture, Urine  - sulfamethoxazole-trimethoprim (BACTRIM DS) 800-160 mg per tablet; Take 1 tablet by mouth 2 (two) times a day for 10 days.  Dispense: 10 tablet; Refill: 0     Plan:  Plan:   1. Medications: TMP/SMX  2. Maintain adequate hydration  3. Follow up if symptoms not improving, and prn.     Subjective:   Emily Vidales is a 41 y.o. female who complains of dysuria, urgency and cramping for 4 days.  Patient also complains of lower abdominal pain. Patient denies back pain, congestion, cough, fever, headache, rhinitis, sorethroat and vaginal discharge.  Patient was treated for UTI three weeks ago. Patient does have a history of recurrent UTI.  Patient does not have a history of pyelonephritis.  The following portions of the patient's history were reviewed and updated as appropriate: allergies, current medications, past family history, past medical history, past social history, past surgical history and problem list.  Review of Systems  A 12 point comprehensive review of systems was negative except as noted.      Objective:      BP (!) 136/95   Pulse 97   Wt 198 lb 9 oz (90.1 kg)   SpO2 99%   BMI 34.08 kg/m    General: alert, appears stated age and cooperative   Abdomen: soft, non-tender, without masses or organomegaly and tenderness moderate in the periumbilical area    Back: CVA tenderness absent   : defer exam     Laboratory:   Urine dipstick shows 1+ for leukocyte esterase.    Micro exam: + WBCs per HPF.

## 2021-06-16 PROBLEM — I10 BENIGN ESSENTIAL HYPERTENSION: Status: ACTIVE | Noted: 2020-03-30

## 2021-06-16 PROBLEM — F33.9 RECURRENT MAJOR DEPRESSIVE DISORDER (H): Status: ACTIVE | Noted: 2020-03-30

## 2021-06-16 NOTE — TELEPHONE ENCOUNTER
Pt called stating he Childrens father tested postive today for covid 19 and her children was with him last Sunday, she is wandering what she should do?    RN advised pt her children should quarantine for 14 days, and if she would like they can get tested. Mom stated she will have them tested. RN advised they shouldn't go to school should me home quarantine, and mom stated okay.      Reggie Murrell RN  [facility]      COVID 19 Nurse Triage Plan/Patient Instructions    Please be aware that novel coronavirus (COVID-19) may be circulating in the community. If you develop symptoms such as fever, cough, or SOB or if you have concerns about the presence of another infection including coronavirus (COVID-19), please contact your health care provider or visit  [email or web address].    Disposition/Instructions    Home care recommended. Follow home care protocol based instructions.    Thank you for taking steps to prevent the spread of this virus.  o Limit your contact with others.  o Wear a simple mask to cover your cough.  o Wash your hands well and often.    Resources    [facility]: About COVID-19: [email or web address]    CDC: What to Do If You're Sick: www.cdc.gov/coronavirus/2019-ncov/about/steps-when-sick.html    CDC: Ending Home Isolation: www.cdc.gov/coronavirus/2019-ncov/hcp/disposition-in-home-patients.html     CDC: Caring for Someone: www.cdc.gov/coronavirus/2019-ncov/if-you-are-sick/care-for-someone.html     [facility]: Interim Guidance for Hospital Discharge to Home: www.health.[facility].[email or web address]/diseases/coronavirus/hcp/hospdischarge.pdf    [facility] clinical trials (COVID-19 research studies): [email or web address]/um-[email or web address]-trials     Below are the COVID-19 hotlines at the [facility] ([facility]). Interpreters are available.   o For health questions: Call 741-327-0618 or 1-539.885.8730 (7 a.m. to 7 p.m.)  o For questions about schools and  childcare: Call 408-551-2789 or 1-111.624.4564 (7 a.m. to 7 p.m.)         Reason for Disposition    COVID-19 Home Isolation, questions about    Protocols used: CORONAVIRUS (COVID-19) DIAGNOSED OR [id number] 1.3.21

## 2021-06-18 NOTE — PATIENT INSTRUCTIONS - HE
Patient Instructions by Leena Dawson FNP at 2/24/2020  3:10 PM     Author: Leena Dawson FNP Service: -- Author Type: Nurse Practitioner    Filed: 2/24/2020  4:14 PM Encounter Date: 2/24/2020 Status: Signed    : Leena Dawson FNP (Nurse Practitioner)           Preventing Skin Cancer     Use sunscreen of SPF 30 or greater. Apply liberally.   Relaxing in the sun may feel good. But it isnt good for your skin. In fact, the suns harmful rays are the major cause of skin cancer. This is a serious disease that can be life-threatening. People of all ages, races, and backgrounds are at risk.  Skin cancer is the most common cancer in the [place] But in most cases, it can be prevented.  Your role in prevention  You can act today to help prevent skin cancer. Start by avoiding the suns UV (ultraviolet) rays. And dont use tanning beds or lamps. They are no safer than the sun. Taking these steps can help keep you from getting skin cancer. It can also help prevent wrinkles and other aging effects caused by the sun. Make sure your children also follow these safeguards. Now is the time to start taking steps to prevent skin cancer.  When you are outdoors  Protect your skin when you go out during the day. Take safety steps whenever you go out to eat, run errands by car or on foot, or do any outdoor activity. There isnt just one easy way to protect your skin. Its best to follow all of these steps:    Wear tightly woven clothing that covers your skin. Put on a wide-brimmed hat to protect your face, ears, and scalp.    Watch the clock. Try to stay out of the sun between 10 a.m. and 4 p.m. That's when the sun's rays are strongest.    Head for the shade or create your own. Use an umbrella when sitting or strolling.    Know that the suns rays can reflect off sand, water, and snow. This can harm your skin. Take extra care when you are near reflective surfaces.    Keep in mind that even when the weather is hazy or cloudy,  "your skin can be exposed to strong UV rays.    Shield your skin with sunscreen. Also use sunscreen on your childrens skin. Keep babies younger than 6 months old out of the sun.  Tips for using sunscreen  To help prevent skin cancer, choose the right sunscreen and use it correctly. Try these tips:    Choose a sunscreen that has an SPF (sun protection factor) of at least 30. Also choose a sunscreen labeled \"broad spectrum. This will protect you from both UVA and UVB (ultraviolet A and B) rays.    If one brand irritates your skin, try another, such as one without fragrance.    Use a water-resistant sunscreen if you swim or sweat.    Use at least 1 ounce of sunscreen to cover exposed areas. This is enough to fill a shot glass. You might need to adjust the amount depending on your body size.    Put the sunscreen on dry skin about 15 minutes before going outdoors. This gives it time to soak in.    Reapply sunscreen every 2 hours. If youre active, do this more often.    Cover any sun-exposed skin, from your face to your feet. Dont forget your scalp, ears, and lips.    Know that while sunscreen helps protect you, it isnt enough. Sunscreens extend the length of time you can be outdoors before your skin starts to get red. But they don't give you total protection. Using sunscreen doesn't mean you can stay out in the sun for an unlimited time. Your skin cells are still being damaged. You should also wear protective clothing. And try to stay out of the sun as much as you can, especially from 10 a.m. to 4 p.m.  Date Last Reviewed: 7/1/2019 2000-2019 [employer]. [address] [place] [address], [place] [address]. All rights reserved. This information is not intended as a substitute for professional medical care. Always follow your healthcare professional's instructions.             "

## 2021-06-19 NOTE — PROGRESS NOTES
Assessment & Plan:  1. Encounter for other contraceptive management  Patient here to discuss long-term contraception management.  We discussed the different hormonal and she is not interested in patch or pill.  We discussed the Nexplanon which she is not interested.  We discussed both forms of IUD which she has concerns about infection.  She would like a tubal ligation so placed a referral to OB/GYN  - Ambulatory referral to Obstetrics / Gynecology    2. Tobacco abuse  Patient is continuing to smoke.  We discussed at length ways to stop smoking.  Recommend she start a smoking journal so she is aware of her smoking habit discussed different medications to use to stop smoking at this point she does not want any medication      Orders Placed This Encounter   Procedures     Ambulatory referral to Obstetrics / Gynecology     Referral Priority:   Routine     Referral Type:   Consultation     Referral Reason:   Evaluation and Treatment     Requested Specialty:   Obstetrics and Gynecology     Number of Visits Requested:   1     Medications Discontinued During This Encounter   Medication Reason     metroNIDAZOLE (METROGEL) 0.75 % vaginal gel Therapy completed     MV/FA/DHA/EPA/COQ10/CA/D3/CRAN (WOMEN'S VITAPAK ORAL) Formulary change       No Follow-up on file.    I have counseled the patient for tobacco cessation and the follow up will occur  at the next visit.    Chief Complaint:   Chief Complaint   Patient presents with     Contraception     Discuss birth control options     Vaginal Itching     c/o itching X1 week, did do Quiros visit and was prescribed Diflucan, symptoms are better but not resolved       History of Present Illness:  Elisabeth is a 38 y.o. female presenting to the clinic today for consultation for birth control. She has two kids and is sure that she does not want any more children. She notes heavy periods and cramping without the use of birth control. She is considering permanent options, but is also  "interested in long term birth control. She has used the patch and the pill in the past and did not like them. She is concerned of infection with IUDs. She does not use an OBGYN group at this time. She is interested in a tubal ligation for a permanent option.     Review of Systems:  She has a history of an abnormal pap smear. She is smoking less than she was before and is working on quitting; she has quit in the past. She is in therapy for her divorce. She relates depression to her environment and struggle with her . All other systems are negative.     PFSH:  She is an everyday smoker. She is going through a divorce currently.     Tobacco Use:  History   Smoking Status     Current Every Day Smoker     Types: Cigarettes   Smokeless Tobacco     Never Used     Comment: a couple a day       Vitals:  Vitals:    08/02/18 0931   BP: 123/82   Patient Site: Left Arm   Patient Position: Sitting   Cuff Size: Adult Regular   Pulse: 73   Resp: 16   Temp: 98.3  F (36.8  C)   TempSrc: Oral   Weight: 169 lb 3.2 oz (76.7 kg)   Height: 5' 3\" (1.6 m)     Wt Readings from Last 3 Encounters:   08/02/18 169 lb 3.2 oz (76.7 kg)   06/12/17 173 lb 1.6 oz (78.5 kg)   10/21/16 173 lb 4 oz (78.6 kg)     Body mass index is 29.97 kg/(m^2).      Physical Exam:  Constitutional:  Reveals an alert, cooperative, pleasant female in no acute distress.  Vitals:  Per nursing notes.  Cardiac:  Regular rate and rhythm without murmurs, rubs, or gallops.   Lungs: Clear.  Respiratory effort normal.  Neurologic:  No gross focal deficits.    Psychiatric:  Mood appropriate, memory intact.     Data Reviewed:  Additional history summarized (from old records or history from someone other than the patient or another healthcare provider) (2 TOTAL): 2 reviewed previous note from Dr Brooks.     Decision to obtain extra information (old records requested or history from another person or accessing Care Everywhere) (1 TOTAL): None.     Radiology tests summarized " or ordered (XRAY/CT/MRI/DXA) (1 TOTAL): None.    Labs reviewed or ordered (1 TOTAL): None.    Medicine tests summarized or ordered (EKG/ECHO) (1 TOTAL): None.    Independent review of EKG or X-Ray (2 EACH): None.       The visit lasted a total of 22 minutes face to face with the patient. Over 50% of the time was spent counseling and educating the patient about contraception. Another 5 minutes were spent discussing smoking cessation.     I, Rachael Baxter, am scribing for and in the presence of, Dr. Terry.    Becca TAFOYA MD, personally performed the services described in this documentation, as scribed by Rachael Baxter in my presence, and it is both accurate and complete.    Medications:  Current Outpatient Prescriptions   Medication Sig Dispense Refill     buPROPion (WELLBUTRIN XL) 150 MG 24 hr tablet Take 1 tablet (150 mg total) by mouth daily. 90 tablet 1     citalopram (CELEXA) 40 MG tablet Take 1 tablet (40 mg total) by mouth daily. 90 tablet 3     mv-min/iron/folic/calcium/vitK (WOMEN'S MULTIVITAMIN ORAL) Take by mouth.       No current facility-administered medications for this visit.        Total Data Points: 2

## 2021-06-19 NOTE — LETTER
Letter by Leena Dawson FNP at      Author: Leena Dawson FNP Service: -- Author Type: --    Filed:  Encounter Date: 5/8/2019 Status: (Other)         May 8, 2019     Patient: Elisabeth Carranza   YOB: 1980   Date of Visit: 5/8/2019       To Whom It May Concern:    It is my medical opinion that Elisabeth Carranza should remain out of work today, 5/8/2019 and return on 5/9/2019 due to resent health changes.     If you have any questions or concerns, please don't hesitate to call.    Sincerely,        Electronically signed by RADHA Moyer

## 2021-06-19 NOTE — LETTER
Letter by Kenya Chappell MD at      Author: Kenya Chappell MD Service: -- Author Type: --    Filed:  Encounter Date: 2019 Status: (Other)         Elisabeth Carranza  [address] [facility] [address]      2019      Dear Elisabeth Carranza,   : 1980      This letter is in regards to the appointment that you had scheduled on 2019 at the [facility] with Dr. Chappell.     The [facility] strives to see all patients in a timely manner and we need your help to achieve this.  The above-mentioned appointment was missed and we do not have record of a cancellation by you.  Whenever possible, we request appointment cancellations at least 72 hours in advance.  This time allows us to offer the appointment to another patient in need.      If you feel you have received this letter in error, or if you need to reschedule this appointment, please call our office so that we may update our records.      Sincerely,    [facility]

## 2021-06-20 NOTE — PROGRESS NOTES
Preoperative Exam    Scheduled Procedure: Tubal Ligation  Surgery Date:  9/28/18  Surgery Location: [facility], fax [phone]    Surgeon:  Dr. Holland    Assessment/Plan:     1. Pre-op examination  Cleared for surgery. Mild elevation in BP---recheck today.  Follow up if ongoing BP >140/90.  Consider limitations at job post op given physical nature of work.  - Hemoglobin    2. Encounter for sterilization  Pt electing to do tubal ligation.   See above.  Counseled on safe sex practices to prevent STD.    3. Tobacco abuse  1/2 pack/day; advised to quit.  Pt declines support to quit at this time---plans to quit in '19.  Follow up prn.  Pt defers flu shot today---will do with her kids this season.    4. Moderate major depression (H)  PHQ 9 = 13 today; not suicidal/homicidal.  Continue current meds---transition to effexor post op per counselor recommendation.  Continue counseling.  Check thyroid and hemoglobin to r/o metabolic issues  - Thyroid Wilkinson    5. Bite on hand (human)----follow up with worker comp    The following are part of a depression follow up plan for the patient:  under care of mental health counselor  The following high BMI interventions were performed this visit: encouragement to exercise and weight monitoring  I have counseled the patient for tobacco cessation and the follow up will occur  at the next visit.        Surgical Procedure Risk: Low (reported cardiac risk generally < 1%)  Have you had prior anesthesia?: Yes  Have you or any family members had a previous anesthesia reaction:  No  Do you or any family members have a history of a clotting or bleeding disorder?: No  Cardiac Risk Assessment: no increased risk for major cardiac complications    Patient approved for surgery with general or local anesthesia.        Functional Status: Independent  Patient plans to recover at home with family.     Subjective:      Elisabeth Carranza is a 38 y.o. female who presents for a preoperative  consultation.   Pt has had counseling regarding birth control options and pt elects tubal ligation.    Pt continues to have regular menses.  + sexually active with condoms now.  Last pap 6/14 normal w/ neg HPV (distant hx of abnormal pap).    Pt otherwise well w/ hx of depression---see plan.    Bite (human) on hand from work (being followed by worker comp); no signs of infection.  No fever. Not on Abx.    All other systems reviewed and are negative, other than those listed in the HPI.    Pertinent History  Do you have difficulty breathing or chest pain after walking up a flight of stairs: No  History of obstructive sleep apnea: No  Steroid use in the last 6 months: No  Frequent Aspirin/NSAID use: No  Prior Blood Transfusion: No  Prior Blood Transfusion Reaction: No  If for some reason prior to, during or after the procedure, if it is medically indicated, would you be willing to have a blood transfusion?:  There is no transfusion refusal.    Current Outpatient Prescriptions   Medication Sig Dispense Refill     buPROPion (WELLBUTRIN XL) 150 MG 24 hr tablet TAKE 1 TABLET(150 MG) BY MOUTH DAILY 90 tablet 3     citalopram (CELEXA) 40 MG tablet Take 1 tablet (40 mg total) by mouth daily. 90 tablet 3     mv-min/iron/folic/calcium/vitK (WOMEN'S MULTIVITAMIN ORAL) Take by mouth.       No current facility-administered medications for this visit.         No Known Allergies    Patient Active Problem List   Diagnosis     Pap Smear (+) Low Grade Squamous Intraepithelial Lesion     Tobacco abuse       Past Medical History:   Diagnosis Date     Depression      HPV (human papilloma virus) anogenital infection     past history, not active per pt.        Past Surgical History:   Procedure Laterality Date     APPENDECTOMY  2012     WISDOM TOOTH EXTRACTION Bilateral        Social History     Social History     Marital status:      Spouse name: N/A     Number of children: N/A     Years of education: N/A     Occupational History  "    Not on file.     Social History Main Topics     Smoking status: Current Every Day Smoker     Types: Cigarettes     Smokeless tobacco: Never Used      Comment: a couple a day     Alcohol use 3.0 oz/week     5 Glasses of wine per week     Drug use: No     Sexual activity: Not Currently     Partners: Male     Other Topics Concern     Not on file     Social History Narrative       Patient Care Team:  Elisabeth Arita MD as PCP - General  Kaylen Holland MD as Physician (Obstetrics and Gynecology)          Objective:     Vitals:    09/24/18 1045   BP: 141/86   Pulse: 87   Temp: 97.1  F (36.2  C)   TempSrc: Oral   SpO2: 100%   Weight: 173 lb (78.5 kg)   Height: 5' 3\" (1.6 m)   LMP: 09/10/2018         Physical Exam:      General Appearance:    Alert, cooperative, no distress, appears stated age   Head:    Normocephalic, without obvious abnormality, atraumatic   Eyes:    PERRL, conjunctiva/corneas clear, EOM's intact both eyes   Ears:    Normal TM's and external ear canals, both ears   Nose:   Nares normal, septum midline, mucosa normal   Throat:   Lips, mucosa, and tongue normal; teeth and gums normal   Neck:   Supple, symmetrical, trachea midline, no adenopathy;     thyroid:  no enlargement/tenderness/nodules; no carotid    bruit or JVD   Back:     Symmetric, no curvature, no CVA tenderness   Lungs:     Clear to auscultation bilaterally, respirations unlabored   Chest Wall:    No tenderness or deformity    Heart:    Regular rate and rhythm, S1 and S2 normal, no murmur, rub   or gallop   Breast Exam:    deferred   Abdomen:     Soft, non-tender, bowel sounds active all four quadrants,     no masses, no organomegaly   Genitalia:    deferred   Rectal:   deferred   Extremities:   Extremities normal, atraumatic, no cyanosis or edema   Pulses:   2+ and symmetric all extremities   Skin:   Skin color, texture, turgor normal, no rashes.  Bite on hand appears to be healing w/o discharge, swelling.  Minimal redness.     "   Neurologic:   CNII-XII intact             There are no Patient Instructions on file for this visit.    EKG:  none    Labs:  Labs pending at this time.  Results will be reviewed when available.    Immunization History   Administered Date(s) Administered     DT (pediatric) 05/01/1999     Hep A, historic 02/18/2009     Influenza, Seasonal, Inj PF IIV3 09/27/2010     Influenza, seasonal,quad inj 36+ mos 10/21/2016     Influenza, seasonal,quad inj 6-35 mos 09/12/2014     Td,adult,historic,unspecified 05/01/1999     Tdap 02/18/2009, 12/03/2014           Electronically signed by Elisabeth Arita MD 09/24/18 10:30 AM

## 2021-06-20 NOTE — ANESTHESIA CARE TRANSFER NOTE
Last vitals:   Vitals:    09/28/18 1114   BP: 146/82   Pulse: (!) 104   Resp: 16   Temp: 36.7  C (98  F)   SpO2: 100%     Patient's level of consciousness is drowsy  Spontaneous respirations: yes  Maintains airway independently: yes  Dentition unchanged: yes  Oropharynx: oropharynx clear of all foreign objects    QCDR Measures:  ASA# 20 - Surgical Safety Checklist: WHO surgical safety checklist completed prior to induction  PQRS# 430 - Adult PONV Prevention: 4558F - Pt received => 2 anti-emetic agents (different classes) preop & intraop  ASA# 8 - Peds PONV Prevention: NA - Not pediatric patient, not GA or 2 or more risk factors NOT present  PQRS# 424 - June-op Temp Management: 4559F - At least one body temp DOCUMENTED => 35.5C or 95.9F within required timeframe  PQRS# 426 - PACU Transfer Protocol: - Transfer of care checklist used  ASA# 14 - Acute Post-op Pain: ASA14B - Patient did NOT experience pain >= 7 out of 10

## 2021-06-20 NOTE — PROGRESS NOTES
CC: The patient is being seen as a consult from Dr Terry secondary to a desire for permanent contraception.    HPI: The pt is a 38 y.o. DWF  who presents with with a desire for a tubal ligation.  She is currently in the process of a divorce and isn't sexually active.  She knows she doesn't want any more children.  Her periods are heavy.  She has tried hormonal contraception in the past and hasn't like how's she felt on it.  She is still smoking a few cigarettes a day.  She is worried about infection with the IUDs.    Past Medical History:   Diagnosis Date     Depression      HPV (human papilloma virus) anogenital infection     past history, not active per pt.        Past Surgical History:   Procedure Laterality Date     APPENDECTOMY  2012     WISDOM TOOTH EXTRACTION Bilateral        Patient's   Family History   Problem Relation Age of Onset     Cancer Mother      Hyperlipidemia Father      Hypertension Father      Thyroid disease Sister      No Medical Problems Brother      Stroke Maternal Grandmother      Alzheimer's disease Paternal Grandmother      Alzheimer's disease Paternal Grandfather      Depression Neg Hx        Patient   Social History     Social History     Marital status:      Spouse name: N/A     Number of children: N/A     Years of education: N/A     Social History Main Topics     Smoking status: Current Every Day Smoker     Types: Cigarettes     Smokeless tobacco: Never Used      Comment: a couple a day     Alcohol use 3.0 oz/week     5 Glasses of wine per week     Drug use: No     Sexual activity: Not Currently     Partners: Male     Other Topics Concern     None     Social History Narrative       Outpatient Medications Prior to Visit   Medication Sig Dispense Refill     buPROPion (WELLBUTRIN XL) 150 MG 24 hr tablet Take 1 tablet (150 mg total) by mouth daily. 90 tablet 1     citalopram (CELEXA) 40 MG tablet Take 1 tablet (40 mg total) by mouth daily. 90 tablet 3      "mv-min/iron/folic/calcium/vitK (WOMEN'S MULTIVITAMIN ORAL) Take by mouth.       No facility-administered medications prior to visit.        Patient has No Known Allergies.    ROS:  12 part ROS is negative aside from those symptoms in the HPI    PE:  /86 (Patient Site: Right Arm, Patient Position: Sitting, Cuff Size: Adult Regular)  Pulse 82  Ht 5' 3\" (1.6 m)  Wt 167 lb (75.8 kg)  LMP 08/02/2018 (Exact Date)  SpO2 99%  BMI 29.58 kg/m2          Body mass index is 29.58 kg/(m^2).    General: overweight WF, NAD  Psych: normal mood  Neuro: CN I-XII grossly intact  MS: normal gait    Assessment: 38 y.o. DWF  with a desire for long term contraception.    Plan: We discussed IUDs as an option for both contraception and help with the heavier periods.  I reassured her that infection risk is very low and usually at the time of insertion itself.  Laparoscopic tubal ligation was discussed with the patient.  She was counseled on the permanence of the procedure, the approximately 1/200 failure rate and the increased risk for ectopic should pregnancy occur.  She was counseled on the pros and cons of LTL including immediate effect but general anesthesia and two incisions.  Questions were answered.  She decided that she can deal with the periods; she'd like to proceed with tubal ligation.  She will be notified when the procedure is scheduled.  She was counseled on the need for someone to drive her home and stay with her after surgery, the need for a pre-op exam prior to the surgery, the need to have nothing to eat or drink after midnight on the day of surgery, and that the surgery center would call her a day or two before the procedure to go over details of the process and what time to arrive.      Approximately 15 minutes were spent with the patient with the majority in counseling.    "

## 2021-06-20 NOTE — ANESTHESIA POSTPROCEDURE EVALUATION
Patient: Elisabeth Carranza  LAPAROSCOPIC TUBAL LIGATION  Anesthesia type: general    Patient location: Phase II Recovery  Last vitals:   Vitals:    09/28/18 1145   BP: 109/63   Pulse: 66   Resp: 16   Temp: 36.4  C (97.5  F)   SpO2: 97%     Post vital signs: stable  Level of consciousness: awake, alert and oriented  Post-anesthesia pain: pain controlled  Post-anesthesia nausea and vomiting: no  Pulmonary: unassisted, return to baseline  Cardiovascular: stable and blood pressure at baseline  Hydration: adequate  Anesthetic events: no    QCDR Measures:  ASA# 11 - June-op Cardiac Arrest: ASA11B - Patient did NOT experience unanticipated cardiac arrest  ASA# 12 - June-op Mortality Rate: ASA12B - Patient did NOT die  ASA# 13 - PACU Re-Intubation Rate: ASA13B - Patient did NOT require a new airway mgmt  ASA# 10 - Composite Anes Safety: ASA10A - No serious adverse event    Additional Notes:

## 2021-06-20 NOTE — ANESTHESIA PREPROCEDURE EVALUATION
Anesthesia Evaluation      Patient summary reviewed   No history of anesthetic complications     Airway   Mallampati: I  Neck ROM: full   Pulmonary - normal exam    breath sounds clear to auscultation  (+) a smoker                         Cardiovascular - negative ROS and normal exam  Exercise tolerance: > or = 4 METS  (-) murmur  Rhythm: regular  Rate: normal,    no murmur      Neuro/Psych    (+) depression, anxiety/panic attacks,     Endo/Other    (+) obesity (BMI 31),      GI/Hepatic/Renal - negative ROS           Dental - normal exam                        Anesthesia Plan  Planned anesthetic: general endotracheal  Decadron 10 mg IV  Zofran      ASA 2   Induction: intravenous   Anesthetic plan and risks discussed with: patient  Anesthesia plan special considerations: antiemetics,   Post-op plan: routine recovery

## 2021-06-26 ENCOUNTER — HEALTH MAINTENANCE LETTER (OUTPATIENT)
Age: 41
End: 2021-06-26

## 2021-06-28 NOTE — PROGRESS NOTES
Progress Notes by Leena Dawson FNP at 2/24/2020  3:10 PM     Author: Leena Dawson FNP Service: -- Author Type: Nurse Practitioner    Filed: 2/24/2020  5:26 PM Encounter Date: 2/24/2020 Status: Signed    : Leena Dawson FNP (Nurse Practitioner)       FEMALE PREVENTATIVE EXAM    Assessment and Plan:   1. Encounter for general adult medical examination without abnormal findings  Healthy female exam  - Gynecologic Cytology (PAP Smear)  - Lipid Cascade- FASTING  - Comprehensive Metabolic Panel  - Hemoglobin  - Vitamin D, Total (25-Hydroxy)  - Thyroid Stimulating Hormone (TSH)    2. Encounter for screening for lipoid disorders  - Lipid Cascade- FASTING    3. Moderate major depression (H)  4.  Generalized anxiety disorder   Medications: Effexor.  Labs: Comprehensive metabolic profile and TSH.  Recommended counseling.  Handouts describing disease, natural history, and treatment were not given to the patient.  Reviewed concept of anxiety as biochemical imbalance of neurotransmitters and rationale for treatment.  Instructed patient to contact office or on-call physician promptly should condition worsen or any new symptoms appear and provided on-call telephone numbers. IF THE PATIENT HAS ANY SUICIDAL OR HOMICIDAL IDEATIONS, CALL THE OFFICE, DISCUSS WITH A SUPPORT MEMBER, OR GO TO THE ER IMMEDIATELY. Patient was agreeable with this plan.  Follow up: 2 weeks.  - venlafaxine (EFFEXOR-XR) 75 MG 24 hr capsule; Take 1 capsule (75 mg total) by mouth daily.  Dispense: 7 capsule; Refill: 0  - venlafaxine (EFFEXOR-XR) 150 MG 24 hr capsule; Take 1 capsule (150 mg total) by mouth daily.  Dispense: 30 capsule; Refill: 0  - hydrOXYzine pamoate (VISTARIL) 25 MG capsule; Take 1-2 capsules (25-50 mg total) by mouth at bedtime.  Dispense: 60 capsule; Refill: 0    4. Benign essential hypertension  Medication: begin lisinopril.  Screening labs for initial evaluation: basic metabolic panel.  Screening for causes of  secondary hypertension: TSH (thyroid disease).  Dietary sodium restriction.  Regular aerobic exercise.  Check blood pressures 2 times daily and record.  Follow up: 2 weeks and as needed.  - lisinopriL (PRINIVIL,ZESTRIL) 10 MG tablet; Take 1 tablet (10 mg total) by mouth daily.  Dispense: 90 tablet; Refill: 3      Next follow up:  No follow-ups on file.    Immunization Review  Adult Imm Review: No immunizations due today  Documented tobacco use.  Website and phone contact for [employer] given to patient in AVS.    I discussed the following with the patient:   Adult Healthy Living: Importance of regular exercise  Healthy nutrition  Getting adequate sleep  Stress management  Use of seat belts  Distracted driving  Helmets  Sporting equipment safety  Firearm safety  STI prevention  Contraception options  Supplement use  Herbal medications/alternative medical therapies    I have had an Advance Directives discussion with the patient.    Subjective:   Chief Complaint: Emily Vidales is an 40 y.o. female here for a preventative health visit.     HPI: Patient reported that she  from her  in middle of 2018 and the finally  at middle of 2019 and since then they have had issues and have been to the court and she has been depressed and anxious since the divorce.  She said her depression and anxiety has gotten worse and her medication was not working anymore and she stopped taking it.  She would like to start something else to help with her depression.  She has tried Wellbutrin and Celexa in the past.  Her last medication was Effexor.  She denied any suicidal or homicidal ideation.  Patient does have 2 children 9 years old and 5 years old.  She works as a special .    Healthy Habits  Are you taking a daily aspirin? No  Do you typically exercising at least 40 min, 3-4 times per week?  NO  Do you usually eat at least 4 servings of fruit and vegetables a day, include whole grains and fiber and avoid  "regularly eating high fat foods? Yes  Have you had an eye exam in the past two years? Yes  Do you see a dentist twice per year? Yes  Do you have any concerns regarding sleep? YES, falling asleep and staying alseep    Safety Screen  If you own firearms, are they secured in a locked gun cabinet or with trigger locks? The patient does not own any firearms  Do you feel you are safe where you are living?: Yes (2/24/2020  3:43 PM)  Do you feel you are safe in your relationship(s)?: Yes (2/24/2020  3:43 PM)      Review of Systems:  Please see above.  The rest of the review of systems are negative for all systems.     Pap History:   No - age 30-65 PAP every 3 years recommended  Cancer Screening       Status Date      PAP SMEAR Next Due 2/24/2025      Done 2/24/2020      Patient has more history with this topic...          Patient Care Team:  Leena Dawson FNP as PCP - General (Nurse Practitioner)  Kaylen Holland MD as Physician (Obstetrics and Gynecology)  Leena Dawson FNP as Assigned PCP        History     Not marked as reviewed during this visit.            Objective:   Vital Signs: There were no vitals taken for this visit.       PHYSICAL EXAM  BP (!) 144/96   Pulse 81   Ht 5' 4\" (1.626 m)   Wt 176 lb 6 oz (80 kg)   SpO2 99%   BMI 30.27 kg/m     General appearance: alert, appears stated age and cooperative  Head: Normocephalic, without obvious abnormality, atraumatic  Eyes: conjunctivae/corneas clear. PERRL, EOM's intact. Fundi benign.  Ears: normal TM's and external ear canals both ears  Nose: Nares normal. Septum midline. Mucosa normal. No drainage or sinus tenderness.  Throat: lips, mucosa, and tongue normal; teeth and gums normal  Neck: no adenopathy, no carotid bruit, no JVD, supple, symmetrical, trachea midline and thyroid not enlarged, symmetric, no tenderness/mass/nodules  Back: symmetric, no curvature. ROM normal. No CVA tenderness.  Lungs: clear to auscultation bilaterally  Heart: regular " rate and rhythm, S1, S2 normal, no murmur, click, rub or gallop  Abdomen: soft, non-tender; bowel sounds normal; no masses,  no organomegaly  Pelvic: cervix normal in appearance, external genitalia normal, no adnexal masses or tenderness, no cervical motion tenderness, rectovaginal septum normal, uterus normal size, shape, and consistency and vagina normal without discharge  Extremities: extremities normal, atraumatic, no cyanosis or edema  Pulses: 2+ and symmetric  Skin: Skin color, texture, turgor normal. No rashes or lesions  Lymph nodes: Cervical, supraclavicular, and axillary nodes normal.  Neurologic: Grossly normal         Medication List          Accurate as of February 24, 2020  5:19 PM. If you have any questions, ask your nurse or doctor.            START taking these medications    hydrOXYzine pamoate 25 MG capsule  Also known as:  VISTARIL  INSTRUCTIONS:  Take 1-2 capsules (25-50 mg total) by mouth at bedtime.  Started by:  RADHA Moyer        lisinopriL 10 MG tablet  Also known as:  PRINIVIL,ZESTRIL  INSTRUCTIONS:  Take 1 tablet (10 mg total) by mouth daily.  Started by:  RADHA Moyer           CHANGE how you take these medications    * venlafaxine 75 MG 24 hr capsule  Also known as:  EFFEXOR-XR  INSTRUCTIONS:  Take 1 capsule (75 mg total) by mouth daily.  Doctor's comments:  Take for one week then start 150 mg tablet.  What changed:  Another medication with the same name was added. Make sure you understand how and when to take each.  Changed by:  RADHA Moyer        * venlafaxine 150 MG 24 hr capsule  Also known as:  EFFEXOR-XR  INSTRUCTIONS:  Take 1 capsule (150 mg total) by mouth daily.  Doctor's comments:  Start after one week of 75 mg tablet.  What changed:  You were already taking a medication with the same name, and this prescription was added. Make sure you understand how and when to take each.  Changed by:  RADHA Moyer            * This list has 2  medication(s) that are the same as other medications prescribed for you. Read the directions carefully, and ask your doctor or other care provider to review them with you.            CONTINUE taking these medications    WOMEN'S MULTIVITAMIN ORAL  INSTRUCTIONS:  Take by mouth.              Where to Get Your Medications      These medications were sent to [id number] - [address] [facility] [address] [address] [address], [facility] [address]    Phone:  [phone]     hydrOXYzine pamoate 25 MG capsule    lisinopriL 10 MG tablet    venlafaxine 150 MG 24 hr capsule    venlafaxine 75 MG 24 hr capsule         Additional Screenings Completed Today:        Spent 40 minutes (>50% of visit) discussing the risks of anxiety disorder, sleep disturbance and depression, Spent another part of the visit discussing the risk of elevated blood pressure and possible management.

## 2021-07-03 NOTE — ADDENDUM NOTE
Addendum Note by Fadumo Oneil LPN at 10/22/2019  3:30 PM     Author: Fadumo Oneil LPN Service: -- Author Type: Licensed Nurse    Filed: 10/22/2019  3:30 PM Encounter Date: 10/22/2019 Status: Signed    : Fadumo Oneil LPN (Licensed Nurse)    Addended by: FADUMO ONEIL on: 10/22/2019 03:30 PM        Modules accepted: Orders

## 2021-07-03 NOTE — ADDENDUM NOTE
Addendum Note by Karina Dawson DO at 10/22/2019  4:09 PM     Author: Karina Dawson DO Service: -- Author Type: Physician    Filed: 10/22/2019  4:09 PM Encounter Date: 10/22/2019 Status: Signed    : Karina Dawson DO (Physician)    Addended by: KARINA DAWSON on: 10/22/2019 04:09 PM        Modules accepted: Orders

## 2021-07-03 NOTE — ADDENDUM NOTE
Addendum Note by Elisabeth Galvez MD at 9/24/2018 12:48 PM     Author: Elisabeth Galvez MD Service: -- Author Type: Physician    Filed: 9/24/2018 12:48 PM Encounter Date: 9/24/2018 Status: Signed    : Elisabeth Galvez MD (Physician)    Addended by: ELISABETH GALVEZ on: 9/24/2018 12:48 PM        Modules accepted: Orders

## 2021-08-03 ENCOUNTER — VIRTUAL VISIT (OUTPATIENT)
Dept: FAMILY MEDICINE | Facility: CLINIC | Age: 41
End: 2021-08-03
Payer: COMMERCIAL
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
## 2021-08-03 DIAGNOSIS — M79.601 PAIN IN BOTH UPPER EXTREMITIES: ICD-10-CM

## 2021-08-03 DIAGNOSIS — M79.602 PAIN IN BOTH UPPER EXTREMITIES: ICD-10-CM

## 2021-08-03 PROCEDURE — 99213 OFFICE O/P EST LOW 20 MIN: CPT | Mod: GT | Performed by: NURSE PRACTITIONER

## 2021-08-03 NOTE — PROGRESS NOTES
Emily is a 41 year old who is being evaluated via a billable video visit.      How would you like to obtain your AVS? MyChart  If the video visit is dropped, the invitation should be resent by: Text to cell phone: [phone]  Will anyone else be joining your video visit? No      Video Start Time: 8:50 AM    Assessment & Plan     Work related injury  Pain in both upper extremities  Improved symptoms. Patient is cleared to go back to work without restrictions.     20 minutes spent on the date of the encounter doing chart review, patient visit and documentation        Tobacco Cessation:   reports that she has been smoking cigarettes. She has a 3.75 pack-year smoking history. She has never used smokeless tobacco.      MEDICATIONS:  Continue current medications without change    No follow-ups on file.    MARIA DE JESUS Castro CNP  [facility]   Emily is a 41 year old who presents for the following health issues improved arm pain, numbness and tingling.  Patient report that she was involved in a work injury in the month of May and after few months of rest her arm feels much better and she is ready to go back to work.  She denied any numbness, tingling and pain on her upper extremities.      Review of Systems   CONSTITUTIONAL: NEGATIVE for fever, chills, change in weight  ENT/MOUTH: NEGATIVE for ear, mouth and throat problems  RESP: NEGATIVE for significant cough or SOB  CV: NEGATIVE for chest pain, palpitations or peripheral edema      Objective           Vitals:  No vitals were obtained today due to virtual visit.    Physical Exam   GENERAL: Healthy, alert and no distress  EYES: Eyes grossly normal to inspection.  No discharge or erythema, or obvious scleral/conjunctival abnormalities.  RESP: No audible wheeze, cough, or visible cyanosis.  No visible retractions or increased work of breathing.    SKIN: Visible skin clear. No significant rash, abnormal pigmentation or  lesions.  NEURO: Cranial nerves grossly intact.  Mentation and speech appropriate for age.  PSYCH: Mentation appears normal, affect normal/bright, judgement and insight intact, normal speech and appearance well-groomed.        Video-Visit Details    Type of service:  Video Visit    Video End Time:9:07 AM    Originating Location (pt. Location): Home    Distant Location (provider location):  [facility]     Platform used for Video Visit: Insights

## 2021-08-03 NOTE — LETTER
August 3, 2021      Emily Vidales  [address] [facility] [address]        To Whom It May Concern:    Emily Vidales was seen in our clinic. She may return to work without restrictions on 8/30/2021.      Sincerely,        MARIA DE JESUS Castro CNP

## 2021-08-11 ENCOUNTER — TRANSFERRED RECORDS (OUTPATIENT)
Dept: HEALTH INFORMATION MANAGEMENT | Facility: CLINIC | Age: 41
End: 2021-08-11

## 2021-10-16 ENCOUNTER — HEALTH MAINTENANCE LETTER (OUTPATIENT)
Age: 41
End: 2021-10-16

## 2021-12-07 ENCOUNTER — OFFICE VISIT (OUTPATIENT)
Dept: FAMILY MEDICINE | Facility: CLINIC | Age: 41
End: 2021-12-07
Payer: COMMERCIAL

## 2021-12-07 VITALS
DIASTOLIC BLOOD PRESSURE: 103 MMHG | HEIGHT: 63 IN | SYSTOLIC BLOOD PRESSURE: 143 MMHG | WEIGHT: 182.38 LBS | BODY MASS INDEX: 32.32 KG/M2 | HEART RATE: 90 BPM

## 2021-12-07 DIAGNOSIS — N93.9 ABNORMAL UTERINE BLEEDING: ICD-10-CM

## 2021-12-07 DIAGNOSIS — Z12.31 ENCOUNTER FOR SCREENING MAMMOGRAM FOR BREAST CANCER: ICD-10-CM

## 2021-12-07 DIAGNOSIS — Z87.42 HX OF ABNORMAL CERVICAL PAP SMEAR: ICD-10-CM

## 2021-12-07 DIAGNOSIS — F41.1 GENERALIZED ANXIETY DISORDER: ICD-10-CM

## 2021-12-07 DIAGNOSIS — Z23 IMMUNIZATION DUE: Primary | ICD-10-CM

## 2021-12-07 DIAGNOSIS — I10 BENIGN ESSENTIAL HYPERTENSION: ICD-10-CM

## 2021-12-07 DIAGNOSIS — N94.6 DYSMENORRHEA: ICD-10-CM

## 2021-12-07 PROBLEM — F33.9 RECURRENT MAJOR DEPRESSIVE DISORDER (H): Status: RESOLVED | Noted: 2020-03-30 | Resolved: 2021-12-07

## 2021-12-07 LAB
ANION GAP SERPL CALCULATED.3IONS-SCNC: 12 MMOL/L (ref 5–18)
BUN SERPL-MCNC: 17 MG/DL (ref 8–22)
CALCIUM SERPL-MCNC: 8.6 MG/DL (ref 8.5–10.5)
CHLORIDE BLD-SCNC: 106 MMOL/L (ref 98–107)
CO2 SERPL-SCNC: 21 MMOL/L (ref 22–31)
CREAT SERPL-MCNC: 0.75 MG/DL (ref 0.6–1.1)
ERYTHROCYTE [DISTWIDTH] IN BLOOD BY AUTOMATED COUNT: 12.9 % (ref 10–15)
GFR SERPL CREATININE-BSD FRML MDRD: >90 ML/MIN/1.73M2
GLUCOSE BLD-MCNC: 91 MG/DL (ref 70–125)
HCT VFR BLD AUTO: 43 % (ref 35–47)
HGB BLD-MCNC: 14.3 G/DL (ref 11.7–15.7)
MCH RBC QN AUTO: 31 PG (ref 26.5–33)
MCHC RBC AUTO-ENTMCNC: 33.3 G/DL (ref 31.5–36.5)
MCV RBC AUTO: 93 FL (ref 78–100)
PLATELET # BLD AUTO: 340 10E3/UL (ref 150–450)
POTASSIUM BLD-SCNC: 3.9 MMOL/L (ref 3.5–5)
RBC # BLD AUTO: 4.62 10E6/UL (ref 3.8–5.2)
SODIUM SERPL-SCNC: 139 MMOL/L (ref 136–145)
TSH SERPL DL<=0.005 MIU/L-ACNC: 2.06 UIU/ML (ref 0.3–5)
WBC # BLD AUTO: 8.3 10E3/UL (ref 4–11)

## 2021-12-07 PROCEDURE — 91300 COVID-19,PF,PFIZER (12+ YRS): CPT | Performed by: FAMILY MEDICINE

## 2021-12-07 PROCEDURE — 80048 BASIC METABOLIC PNL TOTAL CA: CPT | Performed by: FAMILY MEDICINE

## 2021-12-07 PROCEDURE — 99214 OFFICE O/P EST MOD 30 MIN: CPT | Mod: 25 | Performed by: FAMILY MEDICINE

## 2021-12-07 PROCEDURE — 36415 COLL VENOUS BLD VENIPUNCTURE: CPT | Performed by: FAMILY MEDICINE

## 2021-12-07 PROCEDURE — 85027 COMPLETE CBC AUTOMATED: CPT | Performed by: FAMILY MEDICINE

## 2021-12-07 PROCEDURE — 84443 ASSAY THYROID STIM HORMONE: CPT | Performed by: FAMILY MEDICINE

## 2021-12-07 PROCEDURE — 0004A COVID-19,PF,PFIZER (12+ YRS): CPT | Performed by: FAMILY MEDICINE

## 2021-12-07 RX ORDER — LISINOPRIL 10 MG/1
10 TABLET ORAL DAILY
Qty: 90 TABLET | Refills: 3 | Status: SHIPPED | OUTPATIENT
Start: 2021-12-07 | End: 2022-02-03

## 2021-12-07 ASSESSMENT — ANXIETY QUESTIONNAIRES
5. BEING SO RESTLESS THAT IT IS HARD TO SIT STILL: SEVERAL DAYS
7. FEELING AFRAID AS IF SOMETHING AWFUL MIGHT HAPPEN: SEVERAL DAYS
3. WORRYING TOO MUCH ABOUT DIFFERENT THINGS: NEARLY EVERY DAY
2. NOT BEING ABLE TO STOP OR CONTROL WORRYING: NEARLY EVERY DAY
IF YOU CHECKED OFF ANY PROBLEMS ON THIS QUESTIONNAIRE, HOW DIFFICULT HAVE THESE PROBLEMS MADE IT FOR YOU TO DO YOUR WORK, TAKE CARE OF THINGS AT HOME, OR GET ALONG WITH OTHER PEOPLE: VERY DIFFICULT
1. FEELING NERVOUS, ANXIOUS, OR ON EDGE: NEARLY EVERY DAY

## 2021-12-07 ASSESSMENT — PATIENT HEALTH QUESTIONNAIRE - PHQ9: 5. POOR APPETITE OR OVEREATING: SEVERAL DAYS

## 2021-12-07 ASSESSMENT — MIFFLIN-ST. JEOR: SCORE: 1463.44

## 2021-12-07 NOTE — PROGRESS NOTES
Assessment & Plan     1. Benign essential hypertension  - lisinopril (ZESTRIL) 10 MG tablet; Take 1 tablet (10 mg) by mouth daily  Dispense: 90 tablet; Refill: 3  - Basic metabolic panel  (Ca, Cl, CO2, Creat, Gluc, K, Na, BUN); Future    Reviewed blood pressures over the last year and a half, persistently elevated.  Previously treated with lisinopril, but was not compliant.  No side effects.  Will resume lisinopril treatment 10 mg daily.  Follow-up nurse visit in 1 month to recheck blood pressure and repeat lab check.    2. Dysmenorrhea  3. Abnormal uterine bleeding  - CBC with platelets; Future  - TSH with free T4 reflex; Future  - US Pelvic Complete with Transvaginal; Future    Abnormal cramping and heavy bleeding.   Rule out anemia and hypothyroidism.  Evaluate for structural abnormalities.     4. Immunization due  - COVID-19,PF,PFIZER (12+ Yrs PURPLE LABEL)    5. Encounter for screening mammogram for breast cancer  - MA Screen Bilateral w/Wei; Future    6. Hx of abnormal cervical Pap smear  Hx abnormal pap, s/p normal follow up paps. Last completed without HPV co-testing, so due in 3 years rather than 5.  Updated health maintenance.     7. Generalized anxiety disorder  Not interested in medication management at this time. Doesn't have time for formal therapy appointment.   Recommend she look into [facility] and/or [facility]fulness Based Stress Reduction course.   Send iSoccer message if desires selective serotonin reuptake inhibitor vs therapy referral.     Return in about 1 year (around 12/7/2022) for Physical Exam / sooner pending BP follow up and AUB work up.    Mary Reyez, [facility]    Shayla Diaz is a 41 year old who presents for the following health issues    Chief Complaints and History of Present Illnesses   Patient presents with     [facility]     Hypertension        HPI     PHQ9: 8/27  GAD7: 13/21  Depression not bothersome now. More anxiety  "currently. Did take medications in the past.   Life is stressful, lots of events going on.  Has had 2 panic attacks in the last 6 months. Sensation of rock on her chest.   Has done therapy in the past, therapist changed clinics. Has been a few years. Therapy was helpful, worried about time.   Doesn't want to just go to medication options.      Hx abnormal pap smears - have been done here with Dr. Arita.     Bad cramping one day per month, Midol is insufficient. S/p tubal ligation, so not on anything for birth control. This period is different, better. Bleeding has been more heavy than in the past. No previous imaging. Sometimes clotty bleeding.     Blood pressure: over the last 2 years, hasn't had a normal BP. Was taking 10mg of lisinopril at one point in time. Hard to be consistent with medications.     Will do covid shot today.  Doing flu shot with family, defers today.    Review of Systems   10 point ROS negative except for as reported above.       Objective    BP (!) 143/103 (BP Location: Right arm, Patient Position: Sitting, Cuff Size: Adult Regular)   Pulse 90   Ht 1.588 m (5' 2.5\")   Wt 82.7 kg (182 lb 6 oz)   LMP 12/03/2021 (Exact Date)   Breastfeeding No   BMI 32.83 kg/m    Body mass index is 32.83 kg/m .  Physical Exam   GENERAL: Emily is a pleasant, well appearing female, in no acute distress.  HEART: Regular rate and rhythm, no murmurs.  LUNGS: Clear to auscultation bilaterally, unlabored.  ABDOMEN: Soft, non-tender to palpation. No palpable masses.  EXTREMITIES: No lower extremity edema.  PSYCH: Mood is anxious, normal affect, appropriately groomed.         "

## 2021-12-07 NOTE — PATIENT INSTRUCTIONS
Look into [place] [facility] course or [facility].  Let me know if you want a new referral for therapy.   If you decide to try medication options, let me know.     Lets start lisinopril for blood pressure management.   We will recheck kidney function and electrolytes.   Schedule nurse and lab follow up in 1 month.

## 2021-12-08 ASSESSMENT — PATIENT HEALTH QUESTIONNAIRE - PHQ9: SUM OF ALL RESPONSES TO PHQ QUESTIONS 1-9: 8

## 2021-12-08 NOTE — RESULT ENCOUNTER NOTE
Patient updated by Minekey.  -----------------------------  Gerry Diaz,  Thanks again for coming into the clinic. Your labs have returned and are all normal. I will reach out again once your ultrasound results return, as well as after your follow up nurse blood pressure check and repeat labs.  Please reach out with questions or concerns.  Mary Reyez, DO

## 2021-12-28 ENCOUNTER — MYC MEDICAL ADVICE (OUTPATIENT)
Dept: FAMILY MEDICINE | Facility: CLINIC | Age: 41
End: 2021-12-28
Payer: COMMERCIAL

## 2021-12-28 DIAGNOSIS — H91.90 HEARING LOSS, UNSPECIFIED HEARING LOSS TYPE, UNSPECIFIED LATERALITY: Primary | ICD-10-CM

## 2021-12-29 ENCOUNTER — IMMUNIZATION (OUTPATIENT)
Dept: FAMILY MEDICINE | Facility: CLINIC | Age: 41
End: 2021-12-29
Payer: COMMERCIAL

## 2021-12-29 PROCEDURE — 90686 IIV4 VACC NO PRSV 0.5 ML IM: CPT

## 2021-12-29 PROCEDURE — 90471 IMMUNIZATION ADMIN: CPT

## 2021-12-30 ENCOUNTER — OFFICE VISIT (OUTPATIENT)
Dept: AUDIOLOGY | Facility: CLINIC | Age: 41
End: 2021-12-30
Attending: FAMILY MEDICINE
Payer: COMMERCIAL

## 2021-12-30 DIAGNOSIS — H93.11 TINNITUS OF RIGHT EAR: Primary | ICD-10-CM

## 2021-12-30 PROCEDURE — 92567 TYMPANOMETRY: CPT | Performed by: AUDIOLOGIST

## 2021-12-30 PROCEDURE — 92557 COMPREHENSIVE HEARING TEST: CPT | Performed by: AUDIOLOGIST

## 2021-12-30 PROCEDURE — 99207 PR NO CHARGE LOS: CPT | Mod: 25 | Performed by: AUDIOLOGIST

## 2021-12-30 NOTE — PROGRESS NOTES
AUDIOLOGY REPORT    SUBJECTIVE: Emily Vidales is a 41 year old female who was seen in the Audiology Clinic at the [facility] for audiologic evaluation, referred by, Mary Reyez DO. Today, the patient reports difficulty hearing since this past summer. Emily has noticed more difficulty hearing in background noise and understanding speech when others are wearing masks. She reports that others have noticed she has more difficulty hearing. She has become more sensitive to sound. She experiences bilateral aural fullness and right tinnitus at the end of the week. She denies otalgia, otorrhea, dizziness, and ear surgery. Family history is age-related.     OBJECTIVE:  Abuse Screening:  Do you feel unsafe at home or work/school? No  Do you feel threatened by someone? No  Does anyone try to keep you from having contact with others, or doing things outside of your home? No  Physical signs of abuse present? No     Fall Risk Screen:  1. Have you fallen two or more times in the past year? No  2. Have you fallen and had an injury in the past year? No      Otoscopic exam indicates non-occluding cerumen in the right ear and a clear canal at the left ear.     Pure Tone Thresholds assessed using conventional audiometry with good reliability from 250-8000 Hz bilaterally using circumaural headphones.     RIGHT:  Normal hearing sensitivity    LEFT:   Normal hearing sensitivity    Tympanogram:    RIGHT: normal eardrum mobility    LEFT:   normal eardrum mobility    Reflexes (reported by stimulus ear): DNT due to equipment capabilities    Speech Reception Threshold:    RIGHT: 15 dB HL    LEFT:   10 dB HL    Word Recognition Score:     RIGHT: 96% at 60 dB HL using NU-6 recorded word list.    LEFT:   100% at 60 dB HL using NU-6 recorded word list.      ASSESSMENT: Today's results reveal normal hearing sensitivity bilaterally. Today s results were discussed with the patient in detail.     PLAN: It is recommended that  Emily return for annual audiograms as needed. Patient was counseled regarding hearing concerns and the impact on communication. Please call this clinic with questions regarding these results or recommendations.      Jan Pisano, CCC-A  Clinical Audiologist  [place] #[id number]

## 2022-01-04 ENCOUNTER — DOCUMENTATION ONLY (OUTPATIENT)
Dept: LAB | Facility: CLINIC | Age: 42
End: 2022-01-04
Payer: COMMERCIAL

## 2022-01-04 NOTE — PROGRESS NOTES
Emily YARIEL Vidales has an upcoming lab appointment:    Future Appointments   Date Time Provider Department Center   1/7/2022  3:00 PM [facility] MA/LPN [facility]   1/7/2022  3:15 PM [facility] LAB [facility]     Patient is scheduled for the following lab(s): BMP    There is no order available. Please review and place either future orders or HMPO (Review of Health Maintenance Protocol Orders), as appropriate.    Health Maintenance Due   Topic     ANNUAL REVIEW OF HM ORDERS      HEPATITIS C SCREENING      Lamar Diamond, THEAT

## 2022-01-05 NOTE — PROGRESS NOTES
BMP was ordered on 12/7/21. I see it as a future order in the lab section of the chart.  I can't order HIV or hep C without discussion with patient.    Mary Reyez, DO

## 2022-01-31 ENCOUNTER — LAB (OUTPATIENT)
Dept: LAB | Facility: CLINIC | Age: 42
End: 2022-01-31
Payer: COMMERCIAL

## 2022-01-31 DIAGNOSIS — I10 BENIGN ESSENTIAL HYPERTENSION: ICD-10-CM

## 2022-01-31 LAB
ANION GAP SERPL CALCULATED.3IONS-SCNC: 12 MMOL/L (ref 5–18)
BUN SERPL-MCNC: 11 MG/DL (ref 8–22)
CALCIUM SERPL-MCNC: 9.5 MG/DL (ref 8.5–10.5)
CHLORIDE BLD-SCNC: 105 MMOL/L (ref 98–107)
CO2 SERPL-SCNC: 21 MMOL/L (ref 22–31)
CREAT SERPL-MCNC: 0.73 MG/DL (ref 0.6–1.1)
GFR SERPL CREATININE-BSD FRML MDRD: >90 ML/MIN/1.73M2
GLUCOSE BLD-MCNC: 93 MG/DL (ref 70–125)
POTASSIUM BLD-SCNC: 3.8 MMOL/L (ref 3.5–5)
SODIUM SERPL-SCNC: 138 MMOL/L (ref 136–145)

## 2022-01-31 PROCEDURE — 36415 COLL VENOUS BLD VENIPUNCTURE: CPT

## 2022-01-31 PROCEDURE — 80048 BASIC METABOLIC PNL TOTAL CA: CPT

## 2022-02-01 NOTE — RESULT ENCOUNTER NOTE
Normal BMP. Patient updated by ProtAbt message. I am waiting on recent blood pressure readings.  Mary Reyez, DO

## 2022-02-02 ENCOUNTER — TELEPHONE (OUTPATIENT)
Dept: FAMILY MEDICINE | Facility: CLINIC | Age: 42
End: 2022-02-02
Payer: COMMERCIAL

## 2022-02-02 NOTE — TELEPHONE ENCOUNTER
----- Message from Mary Reyez, DO sent at 2/1/2022  4:58 PM CST -----  I see that Emily came back for her labs, but I do not see that a nurse BP visit was documented.   Please check with patient to see if she has home readings to confirm blood pressure readings improved? Or else schedule her for nurse BP check.    Mary Reyez, DO

## 2022-02-03 ENCOUNTER — MYC MEDICAL ADVICE (OUTPATIENT)
Dept: FAMILY MEDICINE | Facility: CLINIC | Age: 42
End: 2022-02-03
Payer: COMMERCIAL

## 2022-02-03 DIAGNOSIS — I10 BENIGN ESSENTIAL HYPERTENSION: ICD-10-CM

## 2022-02-03 RX ORDER — LISINOPRIL 20 MG/1
20 TABLET ORAL DAILY
Qty: 90 TABLET | Refills: 3 | Status: SHIPPED | OUTPATIENT
Start: 2022-02-03 | End: 2023-05-22

## 2022-02-03 NOTE — TELEPHONE ENCOUNTER
Patient sent iSTAR message to follow up on home reported BP. Improved, but still above goal.  Consider increasing lisinopril from 10mg to 20mg.  Mary Reyez, DO

## 2022-02-03 NOTE — TELEPHONE ENCOUNTER
1. Benign essential hypertension  - lisinopril (ZESTRIL) 20 MG tablet; Take 1 tablet (20 mg) by mouth daily  Dispense: 90 tablet; Refill: 3     Increased dose from 10mg to 20mg. Home readings still above goal.    Mary Reyez, DO

## 2022-07-17 ENCOUNTER — HEALTH MAINTENANCE LETTER (OUTPATIENT)
Age: 42
End: 2022-07-17

## 2022-09-25 ENCOUNTER — HEALTH MAINTENANCE LETTER (OUTPATIENT)
Age: 42
End: 2022-09-25

## 2023-05-22 ENCOUNTER — OFFICE VISIT (OUTPATIENT)
Dept: FAMILY MEDICINE | Facility: CLINIC | Age: 43
End: 2023-05-22
Payer: COMMERCIAL

## 2023-05-22 VITALS
OXYGEN SATURATION: 97 % | BODY MASS INDEX: 32.62 KG/M2 | TEMPERATURE: 98.2 F | RESPIRATION RATE: 16 BRPM | HEIGHT: 63 IN | DIASTOLIC BLOOD PRESSURE: 98 MMHG | WEIGHT: 184.1 LBS | HEART RATE: 106 BPM | SYSTOLIC BLOOD PRESSURE: 148 MMHG

## 2023-05-22 DIAGNOSIS — Z13.220 SCREENING FOR LIPID DISORDERS: ICD-10-CM

## 2023-05-22 DIAGNOSIS — Z12.31 ENCOUNTER FOR SCREENING MAMMOGRAM FOR BREAST CANCER: ICD-10-CM

## 2023-05-22 DIAGNOSIS — N94.6 DYSMENORRHEA: ICD-10-CM

## 2023-05-22 DIAGNOSIS — Z12.4 CERVICAL CANCER SCREENING: ICD-10-CM

## 2023-05-22 DIAGNOSIS — Z72.0 TOBACCO ABUSE: ICD-10-CM

## 2023-05-22 DIAGNOSIS — Z11.59 NEED FOR HEPATITIS C SCREENING TEST: ICD-10-CM

## 2023-05-22 DIAGNOSIS — F41.1 GENERALIZED ANXIETY DISORDER: ICD-10-CM

## 2023-05-22 DIAGNOSIS — Z00.00 ROUTINE GENERAL MEDICAL EXAMINATION AT A HEALTH CARE FACILITY: Primary | ICD-10-CM

## 2023-05-22 DIAGNOSIS — I10 BENIGN ESSENTIAL HYPERTENSION: ICD-10-CM

## 2023-05-22 DIAGNOSIS — R19.7 DIARRHEA, UNSPECIFIED TYPE: ICD-10-CM

## 2023-05-22 DIAGNOSIS — Z98.51 HISTORY OF BILATERAL TUBAL LIGATION: ICD-10-CM

## 2023-05-22 LAB
ERYTHROCYTE [DISTWIDTH] IN BLOOD BY AUTOMATED COUNT: 13.1 % (ref 10–15)
ERYTHROCYTE [SEDIMENTATION RATE] IN BLOOD BY WESTERGREN METHOD: 13 MM/HR (ref 0–20)
HCT VFR BLD AUTO: 41.8 % (ref 35–47)
HGB BLD-MCNC: 14.6 G/DL (ref 11.7–15.7)
MCH RBC QN AUTO: 31.9 PG (ref 26.5–33)
MCHC RBC AUTO-ENTMCNC: 34.9 G/DL (ref 31.5–36.5)
MCV RBC AUTO: 92 FL (ref 78–100)
PLATELET # BLD AUTO: 297 10E3/UL (ref 150–450)
RBC # BLD AUTO: 4.57 10E6/UL (ref 3.8–5.2)
WBC # BLD AUTO: 7.7 10E3/UL (ref 4–11)

## 2023-05-22 PROCEDURE — 99214 OFFICE O/P EST MOD 30 MIN: CPT | Mod: 25

## 2023-05-22 PROCEDURE — 86140 C-REACTIVE PROTEIN: CPT

## 2023-05-22 PROCEDURE — 36415 COLL VENOUS BLD VENIPUNCTURE: CPT

## 2023-05-22 PROCEDURE — 99396 PREV VISIT EST AGE 40-64: CPT

## 2023-05-22 PROCEDURE — 86803 HEPATITIS C AB TEST: CPT

## 2023-05-22 PROCEDURE — 80061 LIPID PANEL: CPT

## 2023-05-22 PROCEDURE — 85027 COMPLETE CBC AUTOMATED: CPT

## 2023-05-22 PROCEDURE — 80053 COMPREHEN METABOLIC PANEL: CPT

## 2023-05-22 PROCEDURE — 87624 HPV HI-RISK TYP POOLED RSLT: CPT

## 2023-05-22 PROCEDURE — 85652 RBC SED RATE AUTOMATED: CPT

## 2023-05-22 PROCEDURE — G0145 SCR C/V CYTO,THINLAYER,RESCR: HCPCS

## 2023-05-22 RX ORDER — SERTRALINE HYDROCHLORIDE 25 MG/1
25 TABLET, FILM COATED ORAL DAILY
Qty: 90 TABLET | Refills: 0 | Status: SHIPPED | OUTPATIENT
Start: 2023-05-22 | End: 2023-07-12 | Stop reason: DRUGHIGH

## 2023-05-22 RX ORDER — HYDROXYZINE PAMOATE 25 MG/1
25 CAPSULE ORAL 3 TIMES DAILY PRN
Qty: 30 CAPSULE | Refills: 1 | Status: SHIPPED | OUTPATIENT
Start: 2023-05-22 | End: 2023-07-12

## 2023-05-22 ASSESSMENT — ENCOUNTER SYMPTOMS
HEMATOCHEZIA: 0
EYE PAIN: 0
DIZZINESS: 0
CHILLS: 0
BREAST MASS: 0
HEARTBURN: 0
NERVOUS/ANXIOUS: 1
FEVER: 0
ARTHRALGIAS: 1
SHORTNESS OF BREATH: 0
DYSURIA: 0
NAUSEA: 0
MYALGIAS: 0
JOINT SWELLING: 0
CONSTIPATION: 0
PARESTHESIAS: 0
DIARRHEA: 1
WEAKNESS: 0
ABDOMINAL PAIN: 0
PALPITATIONS: 0
HEMATURIA: 0
FREQUENCY: 0
COUGH: 0
SORE THROAT: 0
HEADACHES: 0

## 2023-05-22 ASSESSMENT — ANXIETY QUESTIONNAIRES
2. NOT BEING ABLE TO STOP OR CONTROL WORRYING: SEVERAL DAYS
7. FEELING AFRAID AS IF SOMETHING AWFUL MIGHT HAPPEN: NOT AT ALL
5. BEING SO RESTLESS THAT IT IS HARD TO SIT STILL: SEVERAL DAYS
GAD7 TOTAL SCORE: 6
4. TROUBLE RELAXING: SEVERAL DAYS
IF YOU CHECKED OFF ANY PROBLEMS ON THIS QUESTIONNAIRE, HOW DIFFICULT HAVE THESE PROBLEMS MADE IT FOR YOU TO DO YOUR WORK, TAKE CARE OF THINGS AT HOME, OR GET ALONG WITH OTHER PEOPLE: SOMEWHAT DIFFICULT
3. WORRYING TOO MUCH ABOUT DIFFERENT THINGS: SEVERAL DAYS
6. BECOMING EASILY ANNOYED OR IRRITABLE: SEVERAL DAYS
1. FEELING NERVOUS, ANXIOUS, OR ON EDGE: SEVERAL DAYS
GAD7 TOTAL SCORE: 6

## 2023-05-22 NOTE — PATIENT INSTRUCTIONS
For anxiety-  1. Start sertraline 25 mg daily. If in 2-4 weeks, you are tolerating this dose and have noticed improvement in your symptoms, you can increase to 50mg. You can also just stay at 25! Follow up with myself your PCP in 4-6 weeks to adjust further/receive refills.  2. You can use 1-2 tablets of hydroxyzine as needed for acute anxiety symptoms  3. Schedule talk therapy    For blood pressure -   1. Schedule nurse only appointment for blood pressure check. If it remains elevated, let's resume lisinopril and I will communicate that plan to you.    For diarrhea-   1. Try an elimination diet, including decreasing gluten, sugar, dairy.   2. Over the counter medications such as Immodium can be helpful  3. Stress/anxiety management   4. Consider fiber supplement (Metamucil) and make sure you are drinking plenty of fluids with this.  Preventive Health Recommendations  Female Ages 40 to 49    Yearly exam:   See your health care provider every year in order to  Review health changes.   Discuss preventive care.    Review your medicines if your doctor prescribed any.    Get a Pap test every three years (unless you have an abnormal result and your provider advises testing more often).    If you get Pap tests with HPV test, you only need to test every 5 years, unless you have an abnormal result. You do not need a Pap test if your uterus was removed (hysterectomy) and you have not had cancer.    You should be tested each year for STDs (sexually transmitted diseases), if you're at risk.   Ask your doctor if you should have a mammogram.    Have a colonoscopy (test for colon cancer) if someone in your family has had colon cancer or polyps before age 50.     Have a cholesterol test every 5 years.     Have a diabetes test (fasting glucose) after age 45. If you are at risk for diabetes, you should have this test every 3 years.    Shots: Get a flu shot each year. Get a tetanus shot every 10 years.     Nutrition:   Eat at least 5  servings of fruits and vegetables each day.  Eat whole-grain bread, whole-wheat pasta and brown rice instead of white grains and rice.  Get adequate Calcium and Vitamin D.      Lifestyle  Exercise at least 150 minutes a week (an average of 30 minutes a day, 5 days a week). This will help you control your weight and prevent disease.  Limit alcohol to one drink per day.  No smoking.   Wear sunscreen to prevent skin cancer.  See your dentist every six months for an exam and cleaning.

## 2023-05-22 NOTE — ASSESSMENT & PLAN NOTE
Annual exam.  Acute concerns discussed as documented elsewhere.  Updated Pap today.  Order placed for mammogram screening with new recommendations to begin at age 40.  Hep C screening done today.  Average risk for colon cancer, so will initiate screening at 45.  BMI is 33; discussed diet and exercise recommendations.  Consider medical weight management in the future if patient desires.  Follow-up in 1 year for next preventative exam or sooner with any acute concerns.

## 2023-05-22 NOTE — ASSESSMENT & PLAN NOTE
No red flag symptoms.  Will obtain labs today, including CMP, CBC and inflammatory markers.  Encouraged dietary modifications and over-the-counter medications and assessing for improvement.  Patient will follow-up if symptoms worsen anyway, or she develops new concerning symptoms.

## 2023-05-22 NOTE — ASSESSMENT & PLAN NOTE
162/106 initially, improved to 148/98 in clinic. Off and on high blood pressure readings per chart review.  Patient does report that she has been seen in a number of walk-in care settings and had normotensive readings.  She was previously prescribed lisinopril, last in 2021.  She took this for approximately a year per her report, and was checking her blood pressure at home which was normal, so she self discontinued this medication.  She is hesitant to restart lisinopril today, as she reports significant anxiety making and being at this appointment and believes that this is contributing to her high blood pressure.  I have asked her to return for a nurse check in the next week or 2.  If at that time, her blood pressure remains elevated above goal, will restart lisinopril.  Updated CMP was drawn today.

## 2023-05-22 NOTE — ASSESSMENT & PLAN NOTE
Chronic issue with acute exacerbation.  Per patient and chart review, she has been treated with multiple medications for both depression and anxiety (Wellbutrin, Celexa, Effexor).  She denies any depressive symptoms today, which is what she has been treated for primarily in the past.  We reviewed the care of mental health, including options for daily medication therapy and as needed medication therapy in combination with the healthy lifestyle habits and talk therapy.  Symptoms are not daily, but to are intrusive and bothersome enough to the patient that she would like to pursue daily medication management with SSRI therapy.  Will initiate 25 mg of sertraline.  Expected therapeutic effects, potential side effects to and the potential for worsening symptoms with the initiation were all discussed today.  Additionally, discussed that it can take anywhere from 4 to 8 weeks for medication to reach maximal effect.  Patient will start at 25mg and increase to 50mg in 2 to 4 weeks to if she notices improvement.  Also prescribed hydroxyzine as needed for acute symptoms.  Order was placed for mental health  for talk therapy.  Patient to follow-up in 4 to 6 weeks for reevaluation of mental health.

## 2023-05-22 NOTE — PROGRESS NOTES
SUBJECTIVE:   CC: Emily is an 43 year old who presents for preventive health visit.       5/22/2023    12:38 PM   Additional Questions   Roomed by ac   Accompanied by self         5/22/2023    12:38 PM   Patient Reported Additional Medications   Patient reports taking the following new medications n/a     Patient has been advised of split billing requirements and indicates understanding: Yes     Anxiety: Started over the weekend. Was so significant at this time that she didn't want to leave her house over anxiety r/t weather/snow. When the weather improved, she did feel as if her symptoms improved. Does not like the idea of medications, but she does find symptoms quite bothersome/interfering with daily life. Will have some physical symptoms. Symptoms are not necessarily present every day. Off and on anxiety throughout the year. Did have PPD and was treated after the birth of her daughter.     Tubal ligation: Had tubal ligation done in 2019. Wants to inquire on possible reversal.     Diarrhea: Ongoing for about 6 weeks. At maximum will have 6 bowel movements in a 24 hour period. Average 2-3 per day. Has taken Pepto Bismal and milk of magnesia. Normal color. Somewhat more odorous than normal. Denies blood in the stool. Some bloating. Does not follow a specific diet or have dietary allergies. Lots of dairy. Fruits and vegetables. Minimal fiber. No changes to medications or recent travel.       Healthy Habits:     Getting at least 3 servings of Calcium per day:  Yes    Bi-annual eye exam:  NO    Dental care twice a year:  Yes    Sleep apnea or symptoms of sleep apnea:  None    Diet:  Low salt    Frequency of exercise:  2-3 days/week    Duration of exercise:  15-30 minutes    Taking medications regularly:  Not Applicable    Medication side effects:  Not applicable    PHQ-2 Total Score: 0    Additional concerns today:  Yes  Anxiety  Associated symptoms include arthralgias. Pertinent negatives include no abdominal pain,  chest pain, chills, congestion, coughing, fever, headaches, joint swelling, myalgias, nausea, rash, sore throat or weakness.     Today's PHQ-2 Score:       5/22/2023    11:47 AM   PHQ-2 ( 1999 [employer])   Q1: Little interest or pleasure in doing things 0   Q2: Feeling down, depressed or hopeless 0   PHQ-2 Score 0   Q1: Little interest or pleasure in doing things Not at all   Q2: Feeling down, depressed or hopeless Not at all   PHQ-2 Score 0       Social History     Tobacco Use     Smoking status: Every Day     Packs/day: 0.50     Years: 15.00     Pack years: 7.50     Types: Cigarettes     Start date: 9/1/2001     Smokeless tobacco: Never     Tobacco comments:     I know its not good   Vaping Use     Vaping status: Never Used   Substance Use Topics     Alcohol use: Yes     Alcohol/week: 5.0 standard drinks of alcohol         5/22/2023    11:36 AM   Alcohol Use   Prescreen: >3 drinks/day or >7 drinks/week? Yes   AUDIT SCORE  5         5/22/2023    11:36 AM   AUDIT - Alcohol Use Disorders Identification Test - Reproduced from the World Health Organization Audit 2001 (Second Edition)   1.  How often do you have a drink containing alcohol? 2 to 3 times a week   2.  How many drinks containing alcohol do you have on a typical day when you are drinking? 1 or 2   3.  How often do you have five or more drinks on one occasion? Monthly   4.  How often during the last year have you found that you were not able to stop drinking once you had started? Never   5.  How often during the last year have you failed to do what was normally expected of you because of drinking? Never   6.  How often during the last year have you needed a first drink in the morning to get yourself going after a heavy drinking session? Never   7.  How often during the last year have you had a feeling of guilt or remorse after drinking? Never   8.  How often during the last year have you been unable to remember what happened the night before because of your  drinking? Never   9.  Have you or someone else been injured because of your drinking? No   10. Has a relative, friend, doctor or other health care worker been concerned about your drinking or suggested you cut down? No   TOTAL SCORE 5     Reviewed orders with patient.  Reviewed health maintenance and updated orders accordingly - Yes  Lab work is in process  Labs reviewed in Epic    BP Readings from Last 3 Encounters:   05/22/23 (!) 148/98   12/07/21 (!) 143/103   02/25/21 (!) 128/91    Wt Readings from Last 3 Encounters:   05/22/23 83.5 kg (184 lb 1.6 oz)   12/07/21 82.7 kg (182 lb 6 oz)   02/25/21 90.1 kg (198 lb 9 oz)             Patient Active Problem List   Diagnosis     Tobacco abuse     Benign essential hypertension     Hx of abnormal cervical Pap smear     Dysmenorrhea     Abnormal uterine bleeding     Generalized anxiety disorder     Routine general medical examination at a health care facility     Diarrhea, unspecified type     Past Surgical History:   Procedure Laterality Date     APPENDECTOMY  2012     LAPAROSCOPIC TUBAL LIGATION  09/28/2018    Dr Amalia ANDRES,TUBAL CAUTERY N/A 9/28/2018    Procedure: LAPAROSCOPIC TUBAL LIGATION;  Surgeon: Kaylen Holland MD;  Location: [facility];  Service: Gynecology     WISDOM TOOTH EXTRACTION Bilateral        Social History     Tobacco Use     Smoking status: Every Day     Packs/day: 0.50     Years: 15.00     Pack years: 7.50     Types: Cigarettes     Start date: 9/1/2001     Smokeless tobacco: Never     Tobacco comments:     I know its not good   Vaping Use     Vaping status: Never Used   Substance Use Topics     Alcohol use: Yes     Alcohol/week: 5.0 standard drinks of alcohol     Family History   Problem Relation Age of Onset     Cancer Mother         bladder     Other Cancer Mother         Newly diagnosed - bladder     Hyperlipidemia Father      Hypertension Father      Alzheimer Disease Father      Thyroid Disease Sister      No Known Problems  Brother      Cerebrovascular Disease Maternal Grandmother      Alzheimer Disease Paternal Grandmother      Alzheimer Disease Paternal Grandfather      Depression No family hx of          Current Outpatient Medications   Medication Sig Dispense Refill     hydrOXYzine (VISTARIL) 25 MG capsule Take 1 capsule (25 mg) by mouth 3 times daily as needed for itching 30 capsule 1     sertraline (ZOLOFT) 25 MG tablet Take 1 tablet (25 mg) by mouth daily for 90 days 90 tablet 0     No Known Allergies  Recent Labs   Lab Test 22  1510 21  1622 20  1705 18  1152 10/21/16  0813   LDL  --   --  115  --  93   HDL  --   --  72  --  52   TRIG  --   --  66  --  63   ALT  --   --  16  --   --    CR 0.73 0.75 0.75   < >  --    GFRESTIMATED >90 >90 >60  --   --    GFRESTBLACK  --   --  >60  --   --    POTASSIUM 3.8 3.9 4.0   < >  --    TSH  --  2.06 1.91   < >  --     < > = values in this interval not displayed.        Breast Cancer Screenin/22/2023    11:48 AM   Breast CA Risk Assessment (FHS-7)   Do you have a family history of breast, colon, or ovarian cancer? No / Unknown       Mammogram Screening - Offered annual screening and updated Health Maintenance for [place] plan based on risk factor consideration    Pertinent mammograms are reviewed under the imaging tab.    History of abnormal Pap smear: NO - age 30-65 PAP every 5 years with negative HPV co-testing recommended        Latest Ref Rng & Units 2020     4:48 PM 2014     2:23 PM   PAP / HPV   PAP Negative for squamous intraepithelial lesion or malignancy. Negative for squamous intraepithelial lesion or malignancy  Electronically signed by Andi Mullins CT (ASCP) on 2020 at  9:44 AM     Negative for squamous intraepithelial lesion or malignancy  Electronically signed by Antoinette Sheridan CT (ASCP) on 2014 at 11:23 AM         Reviewed and updated as needed this visit by clinical staff   Tobacco  Allergies  Meds   "Problems  Med Hx  Surg Hx  Fam Hx          Reviewed and updated as needed this visit by Provider   Tobacco  Allergies  Meds  Problems  Med Hx  Surg Hx  Fam Hx         Review of Systems   Constitutional: Negative for chills and fever.   HENT: Negative for congestion, ear pain, hearing loss and sore throat.    Eyes: Negative for pain and visual disturbance.   Respiratory: Negative for cough and shortness of breath.    Cardiovascular: Negative for chest pain, palpitations and peripheral edema.   Gastrointestinal: Positive for diarrhea. Negative for abdominal pain, constipation, heartburn, hematochezia and nausea.   Breasts:  Negative for tenderness, breast mass and discharge.   Genitourinary: Negative for dysuria, frequency, genital sores, hematuria, pelvic pain, urgency, vaginal bleeding and vaginal discharge.   Musculoskeletal: Positive for arthralgias. Negative for joint swelling and myalgias.   Skin: Negative for rash.   Neurological: Negative for dizziness, weakness, headaches and paresthesias.   Psychiatric/Behavioral: Negative for mood changes. The patient is nervous/anxious.       OBJECTIVE:   BP (!) 148/98 (BP Location: Left arm, Patient Position: Sitting, Cuff Size: Adult Large)   Pulse 106   Temp 98.2  F (36.8  C) (Oral)   Resp 16   Ht 1.588 m (5' 2.5\")   Wt 83.5 kg (184 lb 1.6 oz)   LMP 05/21/2023 (Exact Date)   SpO2 97%   BMI 33.14 kg/m       Physical Exam  GENERAL: healthy, alert and no distress  EYES: Eyes grossly normal to inspection, PERRL and conjunctivae and sclerae normal  HENT: ear canals and TM's normal, nose and mouth without ulcers or lesions  NECK: no adenopathy, no asymmetry, masses, or scars and thyroid normal to palpation  RESP: lungs clear to auscultation - no rales, rhonchi or wheezes  BREAST: deferred by patient. Discussed breast awareness.  CV: regular rate and rhythm, normal S1 S2, no S3 or S4, no murmur, click or rub, no peripheral edema and peripheral pulses " strong  ABDOMEN: soft, nontender, no hepatosplenomegaly, no masses and bowel sounds normal   (female): normal female external genitalia, normal urethral meatus, vaginal mucosa pink, moist, well rugated, and normal cervix/adnexa/uterus without masses. Small amount of blood cervical discharge consistent with menstrual cycle  MS: no gross musculoskeletal defects noted, no edema  SKIN: no suspicious lesions or rashes. Area on right side of face consistent with subcutaneous cyst.  NEURO: Normal strength and tone, mentation intact and speech normal  PSYCH: mentation appears normal, affect normal/bright    Diagnostic Test Results:  Labs reviewed in Epic  Results for orders placed or performed in visit on 05/22/23 (from the past 24 hour(s))   CBC with platelets   Result Value Ref Range    WBC Count 7.7 4.0 - 11.0 10e3/uL    RBC Count 4.57 3.80 - 5.20 10e6/uL    Hemoglobin 14.6 11.7 - 15.7 g/dL    Hematocrit 41.8 35.0 - 47.0 %    MCV 92 78 - 100 fL    MCH 31.9 26.5 - 33.0 pg    MCHC 34.9 31.5 - 36.5 g/dL    RDW 13.1 10.0 - 15.0 %    Platelet Count 297 150 - 450 10e3/uL       ASSESSMENT/PLAN:     Problem List Items Addressed This Visit        Nervous and Auditory    Dysmenorrhea     Has improved. Had a comprehensive workup with her PCP in 2021 which was unrevealing.            Circulatory    Benign essential hypertension     162/106 initially, improved to 148/98 in clinic. Off and on high blood pressure readings per chart review.  Patient does report that she has been seen in a number of walk-in care settings and had normotensive readings.  She was previously prescribed lisinopril, last in 2021.  She took this for approximately a year per her report, and was checking her blood pressure at home which was normal, so she self discontinued this medication.  She is hesitant to restart lisinopril today, as she reports significant anxiety making and being at this appointment and believes that this is contributing to her high blood  pressure.  I have asked her to return for a nurse check in the next week or 2.  If at that time, her blood pressure remains elevated above goal, will restart lisinopril.  Updated CMP was drawn today.         Relevant Orders    Comprehensive metabolic panel (BMP + Alb, Alk Phos, ALT, AST, Total. Bili, TP)       Behavioral    Tobacco abuse     Has quit successfully with her previous pregnancies. Did nicotine replacement therapy.  Will defer cessation counseling today in the context of worsening mental health, but discussed with patient that it should be readdressed in the future in order to optimize her health.         Generalized anxiety disorder     Chronic issue with acute exacerbation.  Per patient and chart review, she has been treated with multiple medications for both depression and anxiety (Wellbutrin, Celexa, Effexor).  She denies any depressive symptoms today, which is what she has been treated for primarily in the past.  We reviewed the care of mental health, including options for daily medication therapy and as needed medication therapy in combination with the healthy lifestyle habits and talk therapy.  Symptoms are not daily, but to are intrusive and bothersome enough to the patient that she would like to pursue daily medication management with SSRI therapy.  Will initiate 25 mg of sertraline.  Expected therapeutic effects, potential side effects to and the potential for worsening symptoms with the initiation were all discussed today.  Additionally, discussed that it can take anywhere from 4 to 8 weeks for medication to reach maximal effect.  Patient will start at 25mg and increase to 50mg in 2 to 4 weeks to if she notices improvement.  Also prescribed hydroxyzine as needed for acute symptoms.  Order was placed for mental health  for talk therapy.  Patient to follow-up in 4 to 6 weeks for reevaluation of mental health.         Relevant Medications    sertraline (ZOLOFT) 25 MG tablet     hydrOXYzine (VISTARIL) 25 MG capsule    Other Relevant Orders    Adult Mental Health  Referral       Other    Routine general medical examination at a health care facility - Primary     Annual exam.  Acute concerns discussed as documented elsewhere.  Updated Pap today.  Order placed for mammogram screening with new recommendations to begin at age 40.  Hep C screening done today.  Average risk for colon cancer, so will initiate screening at 45.  BMI is 33; discussed diet and exercise recommendations.  Consider medical weight management in the future if patient desires.  Follow-up in 1 year for next preventative exam or sooner with any acute concerns.         Diarrhea, unspecified type     No red flag symptoms.  Will obtain labs today, including CMP, CBC and inflammatory markers.  Encouraged dietary modifications and over-the-counter medications and assessing for improvement.  Patient will follow-up if symptoms worsen anyway, or she develops new concerning symptoms.         Relevant Orders    CBC with platelets (Completed)    ESR: Erythrocyte sedimentation rate    CRP, inflammation   Other Visit Diagnoses     Need for hepatitis C screening test        Relevant Orders    Hepatitis C Screen Reflex to HCV RNA Quant and Genotype    Cervical cancer screening        Relevant Orders    Pap Screen with HPV - recommended age 30 - 65 years    History of bilateral tubal ligation        Relevant Orders    Ob/Gyn Referral    Encounter for screening mammogram for breast cancer        Relevant Orders    *MA Screening Digital Bilateral    Screening for lipid disorders        Relevant Orders    Lipid panel reflex to direct LDL Non-fasting          COUNSELING:  Reviewed preventive health counseling, as reflected in patient instructions       Regular exercise       Healthy diet/nutrition       Alcohol Use       Family planning       Osteoporosis prevention/bone health       Consider Hep C screening for all patients one time for  "ages 18-79 years      BMI:   Estimated body mass index is 33.14 kg/m  as calculated from the following:    Height as of this encounter: 1.588 m (5' 2.5\").    Weight as of this encounter: 83.5 kg (184 lb 1.6 oz).   Weight management plan: Discussed healthy diet and exercise guidelines      She reports that she has been smoking cigarettes. She started smoking about 21 years ago. She has a 7.50 pack-year smoking history. She has never used smokeless tobacco.  Nicotine/Tobacco Cessation Plan:   Information offered: Patient not interested at this time    MARIA DE JESUS Alfonso CNP  [facility]  "

## 2023-05-22 NOTE — ASSESSMENT & PLAN NOTE
Has quit successfully with her previous pregnancies. Did nicotine replacement therapy.  Will defer cessation counseling today in the context of worsening mental health, but discussed with patient that it should be readdressed in the future in order to optimize her health.

## 2023-05-23 DIAGNOSIS — R79.89 ELEVATED LFTS: Primary | ICD-10-CM

## 2023-05-23 LAB
ALBUMIN SERPL BCG-MCNC: 4.5 G/DL (ref 3.5–5.2)
ALP SERPL-CCNC: 66 U/L (ref 35–104)
ALT SERPL W P-5'-P-CCNC: 36 U/L (ref 10–35)
ANION GAP SERPL CALCULATED.3IONS-SCNC: 15 MMOL/L (ref 7–15)
AST SERPL W P-5'-P-CCNC: 36 U/L (ref 10–35)
BILIRUB SERPL-MCNC: 0.2 MG/DL
BUN SERPL-MCNC: 17.2 MG/DL (ref 6–20)
CALCIUM SERPL-MCNC: 9.1 MG/DL (ref 8.6–10)
CHLORIDE SERPL-SCNC: 103 MMOL/L (ref 98–107)
CHOLEST SERPL-MCNC: 215 MG/DL
CREAT SERPL-MCNC: 0.69 MG/DL (ref 0.51–0.95)
CRP SERPL-MCNC: <3 MG/L
DEPRECATED HCO3 PLAS-SCNC: 21 MMOL/L (ref 22–29)
GFR SERPL CREATININE-BSD FRML MDRD: >90 ML/MIN/1.73M2
GLUCOSE SERPL-MCNC: 98 MG/DL (ref 70–99)
HCV AB SERPL QL IA: NONREACTIVE
HDLC SERPL-MCNC: 71 MG/DL
LDLC SERPL CALC-MCNC: 123 MG/DL
NONHDLC SERPL-MCNC: 144 MG/DL
POTASSIUM SERPL-SCNC: 4.3 MMOL/L (ref 3.4–5.3)
PROT SERPL-MCNC: 7.6 G/DL (ref 6.4–8.3)
SODIUM SERPL-SCNC: 139 MMOL/L (ref 136–145)
TRIGL SERPL-MCNC: 105 MG/DL

## 2023-05-25 LAB
BKR LAB AP GYN ADEQUACY: NORMAL
BKR LAB AP GYN INTERPRETATION: NORMAL
BKR LAB AP HPV REFLEX: NORMAL
BKR LAB AP LMP: NORMAL
BKR LAB AP PREVIOUS ABNORMAL: NORMAL
PATH REPORT.COMMENTS IMP SPEC: NORMAL
PATH REPORT.COMMENTS IMP SPEC: NORMAL
PATH REPORT.RELEVANT HX SPEC: NORMAL

## 2023-05-29 LAB
HUMAN PAPILLOMA VIRUS 16 DNA: NEGATIVE
HUMAN PAPILLOMA VIRUS 18 DNA: NEGATIVE
HUMAN PAPILLOMA VIRUS FINAL DIAGNOSIS: NORMAL
HUMAN PAPILLOMA VIRUS OTHER HR: NEGATIVE

## 2023-06-14 ENCOUNTER — ALLIED HEALTH/NURSE VISIT (OUTPATIENT)
Dept: FAMILY MEDICINE | Facility: CLINIC | Age: 43
End: 2023-06-14
Payer: COMMERCIAL

## 2023-06-14 VITALS — SYSTOLIC BLOOD PRESSURE: 122 MMHG | DIASTOLIC BLOOD PRESSURE: 82 MMHG

## 2023-06-14 DIAGNOSIS — I10 BENIGN ESSENTIAL HYPERTENSION: Primary | ICD-10-CM

## 2023-06-14 PROCEDURE — 99207 PR NO CHARGE NURSE ONLY: CPT

## 2023-06-14 NOTE — PROGRESS NOTES
I met with Emily Vidales at the request of Claudine Gonzalez NP to recheck her blood pressure.  Blood pressure medications on the med list were reviewed with patient.    Patient has taken all medications as per usual regimen: Yes  Patient reports tolerating them without any issues or concerns: Yes    Vitals:    06/14/23 1049   BP: 122/82   BP Location: Left arm   Patient Position: Sitting   Cuff Size: Adult Large       Blood pressure was taken, previous encounter was reviewed, recorded blood pressure below 140/90.  Patient was discharged and the note will be sent to the provider for final review.

## 2023-07-11 ASSESSMENT — ANXIETY QUESTIONNAIRES
6. BECOMING EASILY ANNOYED OR IRRITABLE: NOT AT ALL
GAD7 TOTAL SCORE: 18
3. WORRYING TOO MUCH ABOUT DIFFERENT THINGS: NEARLY EVERY DAY
4. TROUBLE RELAXING: NEARLY EVERY DAY
2. NOT BEING ABLE TO STOP OR CONTROL WORRYING: NEARLY EVERY DAY
GAD7 TOTAL SCORE: 18
1. FEELING NERVOUS, ANXIOUS, OR ON EDGE: NEARLY EVERY DAY
5. BEING SO RESTLESS THAT IT IS HARD TO SIT STILL: NEARLY EVERY DAY
7. FEELING AFRAID AS IF SOMETHING AWFUL MIGHT HAPPEN: NEARLY EVERY DAY
IF YOU CHECKED OFF ANY PROBLEMS ON THIS QUESTIONNAIRE, HOW DIFFICULT HAVE THESE PROBLEMS MADE IT FOR YOU TO DO YOUR WORK, TAKE CARE OF THINGS AT HOME, OR GET ALONG WITH OTHER PEOPLE: VERY DIFFICULT

## 2023-07-12 ENCOUNTER — VIRTUAL VISIT (OUTPATIENT)
Dept: FAMILY MEDICINE | Facility: CLINIC | Age: 43
End: 2023-07-12
Payer: COMMERCIAL

## 2023-07-12 DIAGNOSIS — R19.7 DIARRHEA, UNSPECIFIED TYPE: ICD-10-CM

## 2023-07-12 DIAGNOSIS — F41.1 GENERALIZED ANXIETY DISORDER: Primary | ICD-10-CM

## 2023-07-12 PROCEDURE — 99214 OFFICE O/P EST MOD 30 MIN: CPT | Mod: VID | Performed by: FAMILY MEDICINE

## 2023-07-12 RX ORDER — HYDROXYZINE PAMOATE 25 MG/1
25-50 CAPSULE ORAL 3 TIMES DAILY PRN
Qty: 30 CAPSULE | Refills: 1
Start: 2023-07-12 | End: 2024-03-14

## 2023-07-12 ASSESSMENT — PATIENT HEALTH QUESTIONNAIRE - PHQ9: SUM OF ALL RESPONSES TO PHQ QUESTIONS 1-9: 10

## 2023-07-12 ASSESSMENT — ANXIETY QUESTIONNAIRES: GAD7 TOTAL SCORE: 18

## 2023-07-12 ASSESSMENT — ENCOUNTER SYMPTOMS: NERVOUS/ANXIOUS: 1

## 2023-07-12 NOTE — PROGRESS NOTES
Emily is a 43 year old who is being evaluated via a billable video visit.      How would you like to obtain your AVS? [employer]  If the video visit is dropped, the invitation should be resent by: Text to cell phone: [phone]  Will anyone else be joining your video visit? No          Assessment & Plan     1. Generalized anxiety disorder  - sertraline (ZOLOFT) 50 MG tablet; Take 1 tablet (50 mg) by mouth daily  Dispense: 90 tablet; Refill: 0  - hydrOXYzine (VISTARIL) 25 MG capsule; Take 1-2 capsules (25-50 mg) by mouth 3 times daily as needed for anxiety (insomnia)  Dispense: 30 capsule; Refill: 1    Increase dose of zoloft from 25mg daily to 50mg daily.   Reach out by [employer] in the next 4 weeks with an update on symptom control. We can further titrate if needed.  I would recommend proceeding with the mental health referral for therapy as previously placed.   Okay to trial 25-50mg of vistaril. I updated the dosing in the chart but did not send in a new prescription.    2. Diarrhea, unspecified type  - Tissue transglutaminase rodrigo IgA and IgG; Future  - TSH with free T4 reflex; Future  - ESR: Erythrocyte sedimentation rate; Future  - CRP inflammation; Future  - Calprotectin Feces; Future     Additional labs ordered. I have placed educational information in the after visit summary regarding dietary modification for IBS. I recommend looking into the low FODMAP diet to trial avoiding common triggers for GI distress. I will follow up when the lab results return.    Mary Reyez, [facility]    Subjective   Emily is a 43 year old, presenting for the following health issues:  Anxiety        7/12/2023     5:55 PM   Additional Questions   Roomed by Abbie KINCAID CMA   Accompanied by Self     Anxiety    History of Present Illness       Mental Health Follow-up:  Patient presents to follow-up on Depression & Anxiety.Patient's depression since last visit has been:  Worse  The patient is having  other symptoms associated with depression.  Patient's anxiety since last visit has been:  Worse  The patient is having other symptoms associated with anxiety.  Any significant life events: No  Patient is feeling anxious or having panic attacks.  Patient has no concerns about alcohol or drug use.   Today's FLOWER-7 Score: 18         3/30/2020     3:00 PM 12/7/2021     4:24 PM 7/12/2023     5:59 PM   PHQ   PHQ-9 Total Score 4 8 10   Q9: Thoughts of better off dead/self-harm past 2 weeks Not at all Not at all Not at all         3/30/2020     3:00 PM 5/22/2023    12:00 PM 7/11/2023    10:03 PM   FLOWER-7 SCORE   Total Score   18 (severe anxiety)   Total Score 3 6 18     Was see for routine health exam on 5/22/23. Started on zoloft 25mg and hydroxyzine PRN, referred for counseling.  Previously treated with wellbutrin, Celexa, and Effexor.   Still at the 25mg dose. Feels like it is helping her mood but not necessarily pit in stomach.   Hydroxyzine at night not really helping.  Has not yet scheduled with therapist.     Had appointment at [facility], had discussed diarrhea. Reports hard to eat anything without watery bowel movements. Takes pepto bismol, turns black, but still loose. Doesn't matter what she eats, all runny.   Thought related to anxiety, but not improving with zoloft.   Wondering if possible allergy - notices worsening with some foods - pasta, breads, cheese - all 3, variable times.   Trying to cut out breads and pastas, cutting back on cheese.   No blood in stools. All stools are diarrhea. Several BMs most days, up to 5 per day.   Urgency to go, needs to stop while driving.   Starts with cramping before bathroom, symptoms improve after BM.   No fevers or chills, had 1 episode of vomiting last night, regularly nauseated.   Experiencing some mild night sweats (baseline), no appreciable weight loss.   Had CMP, CBC, cholesterol at recent appointment. No stool tests.   No prior colonoscopy.  No recent  antibiotics.       Review of Systems   Psychiatric/Behavioral: The patient is nervous/anxious.       See HPI above.     Objective           Vitals:  No vitals were obtained today due to virtual visit.    Physical Exam   GENERAL: Healthy, alert and no distress  EYES: Eyes grossly normal to inspection.  No discharge or erythema, or obvious scleral/conjunctival abnormalities.  RESP: No audible wheeze, cough, or visible cyanosis.  No visible retractions or increased work of breathing.    SKIN: Visible skin clear. No significant rash, abnormal pigmentation or lesions.  NEURO: Cranial nerves grossly intact.  Mentation and speech appropriate for age.  PSYCH: Mentation appears normal, affect normal/bright, judgement and insight intact, normal speech and appearance well-groomed.          Video-Visit Details    Type of service:  Video Visit     Originating Location (pt. Location): Home  Distant Location (provider location):  On-site  Platform used for Video Visit: Anton

## 2023-07-13 ENCOUNTER — NURSE TRIAGE (OUTPATIENT)
Dept: NURSING | Facility: CLINIC | Age: 43
End: 2023-07-13
Payer: COMMERCIAL

## 2023-07-13 NOTE — TELEPHONE ENCOUNTER
Telephone call:    Patient calling to verify information regarding lab appt tmr.    Ruth Lemus RN on 7/13/2023 at 2:14 PM    Reason for Disposition    General information question, no triage required and triager able to answer question    Protocols used: INFORMATION ONLY CALL - NO TRIAGE-A-

## 2023-07-14 ENCOUNTER — LAB (OUTPATIENT)
Dept: LAB | Facility: CLINIC | Age: 43
End: 2023-07-14
Payer: COMMERCIAL

## 2023-07-14 DIAGNOSIS — R19.7 DIARRHEA, UNSPECIFIED TYPE: ICD-10-CM

## 2023-07-14 DIAGNOSIS — R79.89 ELEVATED LFTS: ICD-10-CM

## 2023-07-14 LAB
ALBUMIN SERPL BCG-MCNC: 4.9 G/DL (ref 3.5–5.2)
ALP SERPL-CCNC: 73 U/L (ref 35–104)
ALT SERPL W P-5'-P-CCNC: 43 U/L (ref 0–50)
AST SERPL W P-5'-P-CCNC: 40 U/L (ref 0–45)
BILIRUB DIRECT SERPL-MCNC: <0.2 MG/DL (ref 0–0.3)
BILIRUB SERPL-MCNC: 0.4 MG/DL
CRP SERPL-MCNC: <3 MG/L
ERYTHROCYTE [SEDIMENTATION RATE] IN BLOOD BY WESTERGREN METHOD: 12 MM/HR (ref 0–20)
PROT SERPL-MCNC: 7.7 G/DL (ref 6.4–8.3)
TSH SERPL DL<=0.005 MIU/L-ACNC: 1.39 UIU/ML (ref 0.3–4.2)

## 2023-07-14 PROCEDURE — 86140 C-REACTIVE PROTEIN: CPT

## 2023-07-14 PROCEDURE — 84443 ASSAY THYROID STIM HORMONE: CPT

## 2023-07-14 PROCEDURE — 85652 RBC SED RATE AUTOMATED: CPT

## 2023-07-14 PROCEDURE — 83993 ASSAY FOR CALPROTECTIN FECAL: CPT

## 2023-07-14 PROCEDURE — 80076 HEPATIC FUNCTION PANEL: CPT

## 2023-07-14 PROCEDURE — 36415 COLL VENOUS BLD VENIPUNCTURE: CPT

## 2023-07-14 PROCEDURE — 86364 TISS TRNSGLTMNASE EA IG CLAS: CPT

## 2023-07-16 LAB
TTG IGA SER-ACNC: 1.9 U/ML
TTG IGG SER-ACNC: <0.6 U/ML

## 2023-07-17 NOTE — RESULT ENCOUNTER NOTE
Patient updated by [employer] message with lab results.       Gerry Diaz,  Labs have returned normal and reassuring. Please drop off the stool sample at your convenience.  Mary Reyez, DO

## 2023-07-19 LAB — CALPROTECTIN STL-MCNT: 9.4 MG/KG (ref 0–49.9)

## 2023-07-19 NOTE — RESULT ENCOUNTER NOTE
Patient updated by [employer] message with lab results.      Fecal calprotectin also normal/negative.  Mary Reyez, DO

## 2023-09-18 ENCOUNTER — TRANSFERRED RECORDS (OUTPATIENT)
Dept: HEALTH INFORMATION MANAGEMENT | Facility: CLINIC | Age: 43
End: 2023-09-18
Payer: COMMERCIAL

## 2023-09-21 ENCOUNTER — DOCUMENTATION ONLY (OUTPATIENT)
Dept: FAMILY MEDICINE | Facility: CLINIC | Age: 43
End: 2023-09-21
Payer: COMMERCIAL

## 2023-09-21 NOTE — PROGRESS NOTES
Emily Vidales has an upcoming lab appointment:    Future Appointments   Date Time Provider Department Center   9/25/2023  3:15 PM [facility]     Patient is scheduled for the following lab(s):     Patient requests follow-up labs from [facility] on 7/12/2023 regarding digestive issues. I see that you noted she is due an in-person visit, would you prefer she makes an appt to discuss and then have labs done? Please advise or reach out to reg/sched to have her put on your schedule. Thanks!     There is no order available. Please review and place either future orders or HMPO (Review of Health Maintenance Protocol Orders), as appropriate.    There are no preventive care reminders to display for this patient.    Annette Eden

## 2024-01-18 ENCOUNTER — MYC MEDICAL ADVICE (OUTPATIENT)
Dept: FAMILY MEDICINE | Facility: CLINIC | Age: 44
End: 2024-01-18
Payer: COMMERCIAL

## 2024-01-18 DIAGNOSIS — Z98.51 HX OF TUBAL LIGATION: Primary | ICD-10-CM

## 2024-03-02 ENCOUNTER — HEALTH MAINTENANCE LETTER (OUTPATIENT)
Age: 44
End: 2024-03-02

## 2024-03-13 ASSESSMENT — ANXIETY QUESTIONNAIRES
1. FEELING NERVOUS, ANXIOUS, OR ON EDGE: SEVERAL DAYS
4. TROUBLE RELAXING: NOT AT ALL
6. BECOMING EASILY ANNOYED OR IRRITABLE: NOT AT ALL
3. WORRYING TOO MUCH ABOUT DIFFERENT THINGS: NOT AT ALL
7. FEELING AFRAID AS IF SOMETHING AWFUL MIGHT HAPPEN: NOT AT ALL
8. IF YOU CHECKED OFF ANY PROBLEMS, HOW DIFFICULT HAVE THESE MADE IT FOR YOU TO DO YOUR WORK, TAKE CARE OF THINGS AT HOME, OR GET ALONG WITH OTHER PEOPLE?: NOT DIFFICULT AT ALL
GAD7 TOTAL SCORE: 2
GAD7 TOTAL SCORE: 2
IF YOU CHECKED OFF ANY PROBLEMS ON THIS QUESTIONNAIRE, HOW DIFFICULT HAVE THESE PROBLEMS MADE IT FOR YOU TO DO YOUR WORK, TAKE CARE OF THINGS AT HOME, OR GET ALONG WITH OTHER PEOPLE: NOT DIFFICULT AT ALL
5. BEING SO RESTLESS THAT IT IS HARD TO SIT STILL: NOT AT ALL
7. FEELING AFRAID AS IF SOMETHING AWFUL MIGHT HAPPEN: NOT AT ALL
2. NOT BEING ABLE TO STOP OR CONTROL WORRYING: SEVERAL DAYS

## 2024-03-14 ENCOUNTER — OFFICE VISIT (OUTPATIENT)
Dept: OBGYN | Facility: CLINIC | Age: 44
End: 2024-03-14
Attending: FAMILY MEDICINE
Payer: COMMERCIAL

## 2024-03-14 VITALS
WEIGHT: 185 LBS | OXYGEN SATURATION: 99 % | SYSTOLIC BLOOD PRESSURE: 148 MMHG | HEIGHT: 63 IN | BODY MASS INDEX: 32.78 KG/M2 | DIASTOLIC BLOOD PRESSURE: 96 MMHG | HEART RATE: 88 BPM

## 2024-03-14 DIAGNOSIS — Z98.51 HX OF TUBAL LIGATION: Primary | ICD-10-CM

## 2024-03-14 DIAGNOSIS — Z31.49 PROCREATION MANAGEMENT INVESTIGATION AND TESTING: ICD-10-CM

## 2024-03-14 PROCEDURE — 99203 OFFICE O/P NEW LOW 30 MIN: CPT | Performed by: OBSTETRICS & GYNECOLOGY

## 2024-03-14 PROCEDURE — 36415 COLL VENOUS BLD VENIPUNCTURE: CPT | Performed by: OBSTETRICS & GYNECOLOGY

## 2024-03-14 PROCEDURE — 82166 ASSAY ANTI-MULLERIAN HORM: CPT | Performed by: OBSTETRICS & GYNECOLOGY

## 2024-03-14 RX ORDER — ONDANSETRON 4 MG/1
TABLET, ORALLY DISINTEGRATING ORAL
COMMUNITY
Start: 2023-08-16 | End: 2024-03-14

## 2024-03-16 LAB
Lab: NORMAL
PERFORMING LABORATORY: NORMAL
SPECIMEN STATUS: NORMAL
TEST NAME: NORMAL

## 2024-03-18 LAB — MISCELLANEOUS TEST 1 (ARUP): ABNORMAL

## 2024-03-18 NOTE — PROGRESS NOTES
CC: Emily Vidales is here secondary to wanting to discuss future pregnancies.    HPI: The pt is a 44 year old MWF  who presents with having had a tubal ligation via bilateral fulgaration on 9/28/18.  At that time she was getting  and thought she was done having children.  She has since remarried and is wondering if it's possible to reverse the tubal.  Her periods are still regular.    Past Medical History:   Diagnosis Date    Anxiety     Benign essential hypertension 03/30/2020    Depression     Depressive disorder 2018    Unsure of depression date    HPV (human papilloma virus) anogenital infection     past history, not active per pt.     Pap Smear (+) Low Grade Squamous Intraepithelial Lesion     Created by Conversion     Recurrent major depressive disorder (H24) 03/30/2020       Past Surgical History:   Procedure Laterality Date    APPENDECTOMY  2012    LAPAROSCOPIC TUBAL LIGATION  09/28/2018    Dr Amalia ANDRES,TUBAL CAUTERY N/A 9/28/2018    Procedure: LAPAROSCOPIC TUBAL LIGATION;  Surgeon: Kaylen Holland MD;  Location: [facility];  Service: Gynecology    WISDOM TOOTH EXTRACTION Bilateral        Patient's   Family History   Problem Relation Age of Onset    Cancer Mother         bladder    Other Cancer Mother         Newly diagnosed - bladder    Hyperlipidemia Father     Hypertension Father     Alzheimer Disease Father     Cerebrovascular Disease Maternal Grandmother     Alzheimer Disease Paternal Grandmother     Alzheimer Disease Paternal Grandfather     No Known Problems Brother     Thyroid Disease Sister     Depression No family hx of        Patient   Social History     Socioeconomic History    Marital status:      Spouse name: None    Number of children: None    Years of education: None    Highest education level: None   Tobacco Use    Smoking status: Every Day     Packs/day: 0.50     Years: 15.00     Additional pack years: 0.00     Total pack years: 7.50     Types: Cigarettes  "    Start date: 9/1/2001     Passive exposure: Past    Smokeless tobacco: Never    Tobacco comments:     I know its not good   Vaping Use    Vaping Use: Never used   Substance and Sexual Activity    Alcohol use: Yes     Alcohol/week: 5.0 standard drinks of alcohol    Drug use: Never    Sexual activity: Yes     Partners: Male     Birth control/protection: Female Surgical     Comment: Tubal ligation   Other Topics Concern    Parent/sibling w/ CABG, MI or angioplasty before 65F 55M? No       Outpatient Medications Prior to Visit   Medication Sig Dispense Refill    Multiple Vitamins-Minerals (WOMENS MULTI GUMMIES PO)       hydrOXYzine (VISTARIL) 25 MG capsule Take 1-2 capsules (25-50 mg) by mouth 3 times daily as needed for anxiety (insomnia) 30 capsule 1    ondansetron (ZOFRAN ODT) 4 MG ODT tab  (Patient not taking: Reported on 3/14/2024)      sertraline (ZOLOFT) 50 MG tablet Take 1 tablet (50 mg) by mouth daily 90 tablet 0     No facility-administered medications prior to visit.       Patient has No Known Allergies.    ROS:  12 part ROS is negative aside from those symptoms in the HPI    PE:  BP (!) 148/96 (BP Location: Right arm, Patient Position: Sitting, Cuff Size: Adult Regular)   Pulse 88   Ht 1.588 m (5' 2.5\")   Wt 83.9 kg (185 lb)   LMP 02/29/2024           Body mass index is 33.3 kg/m .    General: obese WF, NAD  Psych: normal mood  Neuro: CN I-XII grossly intact  MS: normal gait    Assessment: 44 year old MWF  with a history of a tubal ligation    Plan: Natural history of tubal reversals discussed with the patient.  We discussed that I don't [place] of anyone [place] who [place] the reversals anymore.  We discussed IVF.  We discussed getting an AMH to see what her baseline fertility reserve is to see if it's worth a referral to [facility].  She will be contacted once those results are back to determine next steps.  Questions were answered to the best of my ability.      Answers submitted by the patient " for this visit:  FLOWER-7 (Submitted on 3/13/2024)  FLOWER 7 TOTAL SCORE: 2

## 2024-04-22 ENCOUNTER — PATIENT OUTREACH (OUTPATIENT)
Dept: CARE COORDINATION | Facility: CLINIC | Age: 44
End: 2024-04-22
Payer: COMMERCIAL

## 2024-04-24 ENCOUNTER — MYC MEDICAL ADVICE (OUTPATIENT)
Dept: FAMILY MEDICINE | Facility: CLINIC | Age: 44
End: 2024-04-24
Payer: COMMERCIAL

## 2024-05-02 ENCOUNTER — ANCILLARY PROCEDURE (OUTPATIENT)
Dept: GENERAL RADIOLOGY | Facility: CLINIC | Age: 44
End: 2024-05-02
Attending: FAMILY MEDICINE
Payer: COMMERCIAL

## 2024-05-02 ENCOUNTER — OFFICE VISIT (OUTPATIENT)
Dept: FAMILY MEDICINE | Facility: CLINIC | Age: 44
End: 2024-05-02
Payer: COMMERCIAL

## 2024-05-02 VITALS
TEMPERATURE: 98 F | WEIGHT: 188.38 LBS | HEART RATE: 80 BPM | HEIGHT: 63 IN | OXYGEN SATURATION: 100 % | SYSTOLIC BLOOD PRESSURE: 147 MMHG | BODY MASS INDEX: 33.38 KG/M2 | RESPIRATION RATE: 16 BRPM | DIASTOLIC BLOOD PRESSURE: 99 MMHG

## 2024-05-02 DIAGNOSIS — Z12.31 ENCOUNTER FOR SCREENING MAMMOGRAM FOR BREAST CANCER: ICD-10-CM

## 2024-05-02 DIAGNOSIS — M13.0 POLYARTHRITIS: ICD-10-CM

## 2024-05-02 DIAGNOSIS — Z23 IMMUNIZATION DUE: ICD-10-CM

## 2024-05-02 DIAGNOSIS — M20.12 HALLUX VALGUS, ACQUIRED, LEFT: ICD-10-CM

## 2024-05-02 DIAGNOSIS — R03.0 ELEVATED BLOOD PRESSURE READING WITHOUT DIAGNOSIS OF HYPERTENSION: ICD-10-CM

## 2024-05-02 DIAGNOSIS — M13.0 POLYARTHRITIS: Primary | ICD-10-CM

## 2024-05-02 PROBLEM — R07.0 PAIN IN THROAT: Status: ACTIVE | Noted: 2022-06-10

## 2024-05-02 PROBLEM — M19.90 ARTHRITIS: Status: ACTIVE | Noted: 2023-05-12

## 2024-05-02 LAB
ERYTHROCYTE [DISTWIDTH] IN BLOOD BY AUTOMATED COUNT: 12.9 % (ref 10–15)
ERYTHROCYTE [SEDIMENTATION RATE] IN BLOOD BY WESTERGREN METHOD: 7 MM/HR (ref 0–20)
HCT VFR BLD AUTO: 43.3 % (ref 35–47)
HGB BLD-MCNC: 15 G/DL (ref 11.7–15.7)
MCH RBC QN AUTO: 31.2 PG (ref 26.5–33)
MCHC RBC AUTO-ENTMCNC: 34.6 G/DL (ref 31.5–36.5)
MCV RBC AUTO: 90 FL (ref 78–100)
PLATELET # BLD AUTO: 308 10E3/UL (ref 150–450)
RBC # BLD AUTO: 4.81 10E6/UL (ref 3.8–5.2)
WBC # BLD AUTO: 8.5 10E3/UL (ref 4–11)

## 2024-05-02 PROCEDURE — 99214 OFFICE O/P EST MOD 30 MIN: CPT | Mod: 25 | Performed by: FAMILY MEDICINE

## 2024-05-02 PROCEDURE — 73502 X-RAY EXAM HIP UNI 2-3 VIEWS: CPT | Mod: TC | Performed by: RADIOLOGY

## 2024-05-02 PROCEDURE — 86431 RHEUMATOID FACTOR QUANT: CPT | Performed by: FAMILY MEDICINE

## 2024-05-02 PROCEDURE — 86038 ANTINUCLEAR ANTIBODIES: CPT | Performed by: FAMILY MEDICINE

## 2024-05-02 PROCEDURE — 73140 X-RAY EXAM OF FINGER(S): CPT | Mod: TC | Performed by: RADIOLOGY

## 2024-05-02 PROCEDURE — 90677 PCV20 VACCINE IM: CPT | Performed by: FAMILY MEDICINE

## 2024-05-02 PROCEDURE — 85652 RBC SED RATE AUTOMATED: CPT | Performed by: FAMILY MEDICINE

## 2024-05-02 PROCEDURE — 80053 COMPREHEN METABOLIC PANEL: CPT | Performed by: FAMILY MEDICINE

## 2024-05-02 PROCEDURE — 73030 X-RAY EXAM OF SHOULDER: CPT | Mod: TC | Performed by: RADIOLOGY

## 2024-05-02 PROCEDURE — 85027 COMPLETE CBC AUTOMATED: CPT | Performed by: FAMILY MEDICINE

## 2024-05-02 PROCEDURE — 73630 X-RAY EXAM OF FOOT: CPT | Mod: TC | Performed by: RADIOLOGY

## 2024-05-02 PROCEDURE — 36415 COLL VENOUS BLD VENIPUNCTURE: CPT | Performed by: FAMILY MEDICINE

## 2024-05-02 PROCEDURE — 86618 LYME DISEASE ANTIBODY: CPT | Performed by: FAMILY MEDICINE

## 2024-05-02 PROCEDURE — 90471 IMMUNIZATION ADMIN: CPT | Performed by: FAMILY MEDICINE

## 2024-05-02 PROCEDURE — 86200 CCP ANTIBODY: CPT | Performed by: FAMILY MEDICINE

## 2024-05-02 PROCEDURE — 84443 ASSAY THYROID STIM HORMONE: CPT | Performed by: FAMILY MEDICINE

## 2024-05-02 RX ORDER — NAPROXEN 500 MG/1
500 TABLET ORAL 2 TIMES DAILY WITH MEALS
Qty: 60 TABLET | Refills: 0 | Status: SHIPPED | OUTPATIENT
Start: 2024-05-02 | End: 2024-08-02

## 2024-05-02 NOTE — PROGRESS NOTES
Hallux valgus, acquired, left  - Orthopedic  Referral; Future     X-ray consistent with bunion, referral placed for podiatry.    Mary Reyez, DO

## 2024-05-02 NOTE — RESULT ENCOUNTER NOTE
Patient updated by [employer] message with x-ray results.     Foot x-ray confirms mild bunion. Negative for arthritis.  I will place referral to podiatry as discussed.  Mary Reyez, DO

## 2024-05-03 LAB
ALBUMIN SERPL BCG-MCNC: 4.7 G/DL (ref 3.5–5.2)
ALP SERPL-CCNC: 76 U/L (ref 40–150)
ALT SERPL W P-5'-P-CCNC: 39 U/L (ref 0–50)
ANION GAP SERPL CALCULATED.3IONS-SCNC: 12 MMOL/L (ref 7–15)
AST SERPL W P-5'-P-CCNC: 30 U/L (ref 0–45)
B BURGDOR IGG+IGM SER QL: 0.27
BILIRUB SERPL-MCNC: 0.4 MG/DL
BUN SERPL-MCNC: 13.4 MG/DL (ref 6–20)
CALCIUM SERPL-MCNC: 9.1 MG/DL (ref 8.6–10)
CHLORIDE SERPL-SCNC: 103 MMOL/L (ref 98–107)
CREAT SERPL-MCNC: 0.73 MG/DL (ref 0.51–0.95)
DEPRECATED HCO3 PLAS-SCNC: 20 MMOL/L (ref 22–29)
EGFRCR SERPLBLD CKD-EPI 2021: >90 ML/MIN/1.73M2
GLUCOSE SERPL-MCNC: 95 MG/DL (ref 70–99)
POTASSIUM SERPL-SCNC: 4.2 MMOL/L (ref 3.4–5.3)
PROT SERPL-MCNC: 7.9 G/DL (ref 6.4–8.3)
RHEUMATOID FACT SERPL-ACNC: <10 IU/ML
SODIUM SERPL-SCNC: 135 MMOL/L (ref 135–145)
TSH SERPL DL<=0.005 MIU/L-ACNC: 2.45 UIU/ML (ref 0.3–4.2)

## 2024-05-03 NOTE — PROGRESS NOTES
Assessment & Plan     1. Polyarthritis  - XR Shoulder Left 2 Views; Future  - XR Hip Left 2-3 Views; Future  - XR Foot Left G/E 3 Views; Future  - CBC with platelets; Future  - Comprehensive metabolic panel (BMP + Alb, Alk Phos, ALT, AST, Total. Bili, TP); Future  - Rheumatoid factor; Future  - Cyclic Citrullinated Peptide Antibody IgG; Future  - Anti Nuclear Violetta IgG by IFA with Reflex; Future  - ESR: Erythrocyte sedimentation rate; Future  - TSH with free T4 reflex; Future  - Lyme Disease Total Abs Bld with Reflex to Confirm CLIA; Future  - naproxen (NAPROSYN) 500 MG tablet; Take 1 tablet (500 mg) by mouth 2 times daily (with meals)  Dispense: 60 tablet; Refill: 0    Given multiple joints impacted, over long period of time, evaluate for underlying systemic etiology with labs per above.  Rule out Lyme disease.  Obtain x-rays to evaluate for arthritis.  Left foot I suspect is a bunion rather than arthritis.  Will consider referral to podiatry when imaging results return.    If workup all reassuring, recommend physical therapy.    Will trial prescription naproxen cautiously for symptom relief.  Recommend taking with food in stomach.      ADDENDUM:   - XR Finger Left G/E 2 Views; Future    Patient also requests imaging of left thumb to re-evaluate arthritis. Orders signed.    2. Elevated blood pressure reading without diagnosis of hypertension  Has home blood pressure cuff, has not been checking regularly.  Will start checking regularly.  We will reach out to patient in 2 weeks for update on home readings.  She reports component of whitecoat hypertension, elevated when in clinic.    If blood pressure persistently elevated and we are not able to get blood pressure in range, would change pain regimen from NSAID to Tylenol.    3. Encounter for screening mammogram for breast cancer  - Pneumococcal 20 Valent Conjugate (Prevnar 20)    4. Immunization due  - *MA Screening Digital Bilateral; Future        Subjective   Emily  [facility] received a [employer] appeal.   Case Control ID: [id number]  EMR Key: [id number]  Clinicals attached via [employer] online portal. Appeal is pending.     Care Manager notified: Lian      Please reach out to  for updates on any clinical information.    is a 44 year old, presenting for the following health issues:  Arthritis      5/2/2024     9:46 AM   Additional Questions   Roomed by Abbie KINCAID CMA   Accompanied by Self     Arthritis    History of Present Illness       Reason for visit:  Arthritis symptoms  Symptom onset:  More than a month  Symptoms include:  Pain in left thumb (arthritis diagnosed May 2023) same feeling in left hip, left foot and left shoulder  Symptom intensity:  Moderate  Symptom progression:  Worsening  Had these symptoms before:  Yes  Has tried/received treatment for these symptoms:  Yes  Previous treatment was successful:  Yes  Prior treatment description:  On left thumb only- had a cortizone shot last year and I was given a brace towear when it is sore  What makes it worse:  Cold air or weather, rainy or changes in weather kind of days  What makes it better:  Rest, walking, stretching    She eats 2-3 servings of fruits and vegetables daily.She consumes 0 sweetened beverage(s) daily.She exercises with enough effort to increase her heart rate 20 to 29 minutes per day.  She exercises with enough effort to increase her heart rate 3 or less days per week.   She is taking medications regularly.     Arthritis  Last year, thought re-injured left thumb at work. Had x-ray, bone spur. Had injection. Wears brace continuously. Symptoms worsening.    Also experiencing pain left hip, left foot, and left shoulder.       SHOULDER: Constant with weather. Before last few weeks, was more irritating, now feels exhausting. Hard to sleep. Will warm up. Range of motion seems okay. Worse with lack of movement (sitting prolonged, driving). No old injuries. Right handed. Arm sometimes feels like falling asleep along bicep.       HIP: Hip been bothersome over a year. Also more intense lately, constant. Feels achy. If moves funny, sharper flare. Tries not to overdue it. Sits a lot with driving and work. No weakness. Sitting, feels like leg will fall asleep. No  "current bladder/bowel incontinence. No known prior injuries.   Has pain down back hip down to knee.       FOOT: Thinks more ball of foot, MTP joint. Toe deviation present. Hurts when she lays on it, can't put pressure over medial foot. Pain different than the hip. Symptoms going on 1.5 -2 years now. Also pretty consistent pain. No numbness in feet.       Denies any redness, warmth, or swelling in joints.   Right side asymptomatic.     Uses ibuprofen sparingly. Doesn't want to rely on meds.   Hasn't been using tylenol.      Elevated blood pressure reading without diagnosis of hypertension  BP Readings from Last 6 Encounters:   05/02/24 (!) 147/99   03/14/24 (!) 148/96   06/14/23 122/82   05/22/23 (!) 148/98   12/07/21 (!) 143/103   02/25/21 (!) 128/91     GFR Estimate   Date Value Ref Range Status   05/22/2023 >90 >60 mL/min/1.73m2 Final     Comment:     eGFR calculated using 2021 CKD-EPI equation.   02/24/2020 >60 >60 mL/min/1.73m2 Final     Has home cuff, hasn't been checking regularly.   Noticing elevations when at the doctor's office.       Health maintenance   - Pneumococcal 20 Valent Conjugate (Prevnar 20): will do   - *MA Screening Digital Bilateral; Future: will do       Review of Systems  See HPI above.         Objective    BP (!) 147/99 (BP Location: Left arm, Patient Position: Sitting, Cuff Size: Adult Regular)   Pulse 80   Temp 98  F (36.7  C) (Oral)   Resp 16   Ht 1.588 m (5' 2.5\")   Wt 85.4 kg (188 lb 6 oz)   LMP 05/01/2024 (Exact Date)   SpO2 100%   BMI 33.91 kg/m    Body mass index is 33.91 kg/m .  Physical Exam   GENERAL: Emily is a pleasant, nontoxic female, in no acute distress.  HEART: Regular rate and rhythm, no murmurs.  LUNGS: Clear to auscultation bilaterally, unlabored.  MSK: Bilateral shoulder range of motion is full with flexion, abduction, external rotation.  No pain with range of motion.  Hip exam reveals slight discomfort with external rotation.  Range of motion intact.  No " worsening of pain with BECKI testing.  No pain with posterior or medial compression of pelvis.  Left foot reveals prominent first MTP joint, slight medial deviation great toe.    Signed Electronically by: Mary Reyez, DO

## 2024-05-03 NOTE — RESULT ENCOUNTER NOTE
Patient updated by [employer] message with x-ray results.      Gerry Diaz,  Shoulder joint itself looks fine.  There is a little bit of change where the collarbone meets the acromion process.  This is over the top lateral aspect of the shoulder.  Use NSAIDs as discussed in clinic.  If symptoms not improving, similar to the hip we can trial some physical therapy.  Mary Reyez, DO

## 2024-05-03 NOTE — RESULT ENCOUNTER NOTE
Patient updated by [employer] message with x-ray results.      Gerry Diaz,  Hip negative for any significant arthritis changes.  I would recommend adding physical therapy if symptoms not improving with NSAID or if persistent.  Mary Reyez, DO

## 2024-05-03 NOTE — RESULT ENCOUNTER NOTE
Patient updated by [employer] message with lab results.      Gerry Diaz,  Hand x-ray does show mild degenerative changes in the CMC joint.  Proceed with plan as discussed in clinic.  If worsening, I would reach back out to Ortho for possible repeat injection, or happy to place referral for hand therapy.  Keep wearing brace.  Mary Reyez, DO

## 2024-05-06 LAB
ANA SER QL IF: NEGATIVE
CCP AB SER IA-ACNC: 0.9 U/ML

## 2024-05-07 NOTE — RESULT ENCOUNTER NOTE
Patient updated by [employer] message with lab results.       Gerry Diaz,  All labs have returned reassuring.  How are things going with the NSAID medication?  Any updates on blood pressure readings?  Mary Reyez, DO

## 2024-05-24 ENCOUNTER — TELEPHONE (OUTPATIENT)
Dept: FAMILY MEDICINE | Facility: CLINIC | Age: 44
End: 2024-05-24
Payer: COMMERCIAL

## 2024-05-24 NOTE — TELEPHONE ENCOUNTER
Left message to call back for: BP  Information to relay to patient: LMTCB, please see message below.

## 2024-05-24 NOTE — TELEPHONE ENCOUNTER
----- Message from Mary Reyez DO sent at 5/2/2024 10:16 AM CDT -----  Please check in with Emily for home blood pressure readings since visit.  Mary Reyez DO

## 2024-07-20 ENCOUNTER — HEALTH MAINTENANCE LETTER (OUTPATIENT)
Age: 44
End: 2024-07-20

## 2024-07-23 ENCOUNTER — TRANSFERRED RECORDS (OUTPATIENT)
Dept: HEALTH INFORMATION MANAGEMENT | Facility: CLINIC | Age: 44
End: 2024-07-23
Payer: COMMERCIAL

## 2024-07-30 SDOH — HEALTH STABILITY: PHYSICAL HEALTH: ON AVERAGE, HOW MANY DAYS PER WEEK DO YOU ENGAGE IN MODERATE TO STRENUOUS EXERCISE (LIKE A BRISK WALK)?: 1 DAY

## 2024-07-30 SDOH — HEALTH STABILITY: PHYSICAL HEALTH: ON AVERAGE, HOW MANY MINUTES DO YOU ENGAGE IN EXERCISE AT THIS LEVEL?: 20 MIN

## 2024-07-30 ASSESSMENT — SOCIAL DETERMINANTS OF HEALTH (SDOH): HOW OFTEN DO YOU GET TOGETHER WITH FRIENDS OR RELATIVES?: ONCE A WEEK

## 2024-08-02 ENCOUNTER — OFFICE VISIT (OUTPATIENT)
Dept: FAMILY MEDICINE | Facility: CLINIC | Age: 44
End: 2024-08-02
Attending: FAMILY MEDICINE
Payer: COMMERCIAL

## 2024-08-02 VITALS
HEART RATE: 111 BPM | SYSTOLIC BLOOD PRESSURE: 132 MMHG | DIASTOLIC BLOOD PRESSURE: 90 MMHG | RESPIRATION RATE: 14 BRPM | HEIGHT: 63 IN | WEIGHT: 186.2 LBS | TEMPERATURE: 98.6 F | OXYGEN SATURATION: 97 % | BODY MASS INDEX: 32.99 KG/M2

## 2024-08-02 DIAGNOSIS — F41.9 SEVERE ANXIETY: ICD-10-CM

## 2024-08-02 DIAGNOSIS — Z78.9 ALCOHOL USE: ICD-10-CM

## 2024-08-02 DIAGNOSIS — Z00.00 ROUTINE GENERAL MEDICAL EXAMINATION AT A HEALTH CARE FACILITY: Primary | ICD-10-CM

## 2024-08-02 DIAGNOSIS — R03.0 ELEVATED BLOOD PRESSURE READING WITHOUT DIAGNOSIS OF HYPERTENSION: ICD-10-CM

## 2024-08-02 DIAGNOSIS — Z72.0 TOBACCO ABUSE: ICD-10-CM

## 2024-08-02 DIAGNOSIS — F32.A MODERATELY SEVERE DEPRESSION: ICD-10-CM

## 2024-08-02 PROBLEM — F10.90 ALCOHOL USE: Status: ACTIVE | Noted: 2024-08-02

## 2024-08-02 PROCEDURE — 99396 PREV VISIT EST AGE 40-64: CPT | Performed by: FAMILY MEDICINE

## 2024-08-02 PROCEDURE — 99214 OFFICE O/P EST MOD 30 MIN: CPT | Mod: 25 | Performed by: FAMILY MEDICINE

## 2024-08-02 RX ORDER — GABAPENTIN 100 MG/1
300 CAPSULE ORAL AT BEDTIME
Qty: 90 CAPSULE | Refills: 3 | Status: SHIPPED | OUTPATIENT
Start: 2024-08-02

## 2024-08-02 RX ORDER — ESCITALOPRAM OXALATE 10 MG/1
10 TABLET ORAL DAILY
Qty: 30 TABLET | Refills: 3 | Status: SHIPPED | OUTPATIENT
Start: 2024-08-02

## 2024-08-02 RX ORDER — DOXYCYCLINE 100 MG/1
100 CAPSULE ORAL 2 TIMES DAILY
COMMUNITY
Start: 2024-07-23 | End: 2024-08-02

## 2024-08-02 ASSESSMENT — ANXIETY QUESTIONNAIRES
GAD7 TOTAL SCORE: 15
2. NOT BEING ABLE TO STOP OR CONTROL WORRYING: MORE THAN HALF THE DAYS
IF YOU CHECKED OFF ANY PROBLEMS ON THIS QUESTIONNAIRE, HOW DIFFICULT HAVE THESE PROBLEMS MADE IT FOR YOU TO DO YOUR WORK, TAKE CARE OF THINGS AT HOME, OR GET ALONG WITH OTHER PEOPLE: SOMEWHAT DIFFICULT
1. FEELING NERVOUS, ANXIOUS, OR ON EDGE: NEARLY EVERY DAY
3. WORRYING TOO MUCH ABOUT DIFFERENT THINGS: MORE THAN HALF THE DAYS
6. BECOMING EASILY ANNOYED OR IRRITABLE: NEARLY EVERY DAY
5. BEING SO RESTLESS THAT IT IS HARD TO SIT STILL: MORE THAN HALF THE DAYS
7. FEELING AFRAID AS IF SOMETHING AWFUL MIGHT HAPPEN: SEVERAL DAYS
GAD7 TOTAL SCORE: 15

## 2024-08-02 ASSESSMENT — PATIENT HEALTH QUESTIONNAIRE - PHQ9
5. POOR APPETITE OR OVEREATING: MORE THAN HALF THE DAYS
SUM OF ALL RESPONSES TO PHQ QUESTIONS 1-9: 20

## 2024-08-02 NOTE — PROGRESS NOTES
Preventive Care Visit  [facility]  Michelet Walls MD, Family Medicine  Aug 2, 2024      Assessment & Plan     ICD-10-CM    1. Routine general medical examination at a health care facility  Z00.00       2. Moderately severe depression  F32.A escitalopram (LEXAPRO) 10 MG tablet     Adult Mental Health  Referral      3. Severe anxiety  F41.9 gabapentin (NEURONTIN) 100 MG capsule     escitalopram (LEXAPRO) 10 MG tablet     Adult Mental Health  Referral      4. Alcohol use  Z78.9 [facility]  Referral      5. Elevated blood pressure reading without diagnosis of hypertension  R03.0       6. Tobacco abuse  Z72.0         Patient is here today for routine physical exam.  Following issues addressed.    1: Moderate to severe depression and anxiety.  In the past she has been on Wellbutrin.  Currently she feels her anxiety is more.  Many years ago during her divorce, she had used Lexapro also with benefit.  We pondered over starting on SSRI to cover both depression and anxiety and choose to restart Lexapro for her.  Will also start on gabapentin as she is drinking alcohol pretty regularly.  Her audit score is 8.  Counseling is done.  Discussed referral to mental health in a referral is done.  If she is unable to get into [facility] in a timely manner, she will also try to find therapy nearby.  List of mental health places nearby discussed and printed for her.  2: Elevated blood pressure: Initial blood pressure was high and then it did come down.  She has had elevated blood pressure through this year.  Some of this can be attributed to alcohol use.  Currently borderline for stage II hypertension.  Continue to monitor and recheck in 3 months time.  Strongly recommend to cut alcohol use.  3: Alcohol use: She will try to cut down.  Gabapentin should help.  Consider naltrexone if no benefit.  4: Tobacco abuse.  Asked to quit.  Patient will consider it.  At  "this time focus on above mental health and alcohol use.  Health maintenance is fairly up-to-date.    I have advised patient to follow-up via MyChart in about 4 to 6 weeks to see if the medication is helping.  If not, she should reach out to us sooner.  She verbalizes understanding.    BMI  Estimated body mass index is 33.25 kg/m  as calculated from the following:    Height as of this encounter: 1.594 m (5' 2.75\").    Weight as of this encounter: 84.5 kg (186 lb 3.2 oz).       Counseling  Appropriate preventive services were addressed with this patient via screening, questionnaire, or discussion as appropriate for fall prevention, nutrition, physical activity, Tobacco-use cessation, weight loss and cognition.  Checklist reviewing preventive services available has been given to the patient.  Reviewed patient's diet, addressing concerns and/or questions.   She is at risk for lack of exercise and has been provided with information to increase physical activity for the benefit of her well-being.   She is at risk for psychosocial distress and has been provided with information to reduce risk.   The patient reports drinking more than 3 alcoholic drinks per day and/or more than 7 drhnks per week. The patient was counseled and given information about possible harmful effects of excessive alcohol intake.The patient's PHQ-9 score is consistent with severe depression. She was provided with information regarding depression.         MEDICATIONS:   Orders Placed This Encounter   Medications    metroNIDAZOLE (METROCREAM) 0.75 % external cream     Sig: APPLY TOPICALLY TO FACE TWICE DAILY    DISCONTD: doxycycline monohydrate (MONODOX) 100 MG capsule     Sig: Take 100 mg by mouth 2 times daily    gabapentin (NEURONTIN) 100 MG capsule     Sig: Take 3 capsules (300 mg) by mouth at bedtime Start with 1 tablet on Day one, 2 on day 2 and then increase to 3 tablets at night time     Dispense:  90 capsule     Refill:  3    escitalopram " (LEXAPRO) 10 MG tablet     Sig: Take 1 tablet (10 mg) by mouth daily     Dispense:  30 tablet     Refill:  3          - Continue other medications without change  Regular exercise  See Patient Instructions    Subjective   Emily is a 44 year old, presenting for the following:  Physical (Fasting labs. ) and Mental Health Problem (Would like to discuss mental health)        8/2/2024    12:21 PM   Additional Questions   Roomed by Antoinette VANG CMA        Health Care Directive  Patient does not have a Health Care Directive or Living Will: Discussed advance care planning with patient; however, patient declined at this time.    Mental Health Problem            7/30/2024   General Health   How would you rate your overall physical health? Good   Feel stress (tense, anxious, or unable to sleep) Only a little      (!) STRESS CONCERN      7/30/2024   Nutrition   Three or more servings of calcium each day? Yes   Diet: Low salt   How many servings of fruit and vegetables per day? (!) 2-3   How many sweetened beverages each day? (!) 2            7/30/2024   Exercise   Days per week of moderate/strenous exercise 1 day   Average minutes spent exercising at this level 20 min      (!) EXERCISE CONCERN      7/30/2024   Social Factors   Frequency of gathering with friends or relatives Once a week   Worry food won't last until get money to buy more No   Food not last or not have enough money for food? No   Do you have housing? (Housing is defined as stable permanent housing and does not include staying ouside in a car, in a tent, in an abandoned building, in an overnight shelter, or couch-surfing.) Yes   Are you worried about losing your housing? No   Lack of transportation? No   Unable to get utilities (heat,electricity)? No            7/30/2024   Dental   Dentist two times every year? Yes            7/30/2024   TB Screening   Were you born outside of the [place]? No              Today's PHQ-2 Score:       3/13/2024     3:11 PM   PHQ-2 ( 1999  [employer])   Q1: Little interest or pleasure in doing things 0   Q2: Feeling down, depressed or hopeless 0   PHQ-2 Score 0   Q1: Little interest or pleasure in doing things Not at all   Q2: Feeling down, depressed or hopeless Not at all   PHQ-2 Score 0         7/30/2024   Substance Use   Alcohol more than 3/day or more than 7/wk Yes   How often do you have a drink containing alcohol 4 or more times a week   How many alcohol drinks on typical day 3 or 4   How often do you have 5+ drinks at one occasion Monthly   Audit 2/3 Score 3   How often not able to stop drinking once started Never   How often failed to do what normally expected Less than monthly   How often needed first drink in am after a heavy drinking session Never   How often feeling of guilt or remorse after drinking Never   How often unable to remember what happened the night before Never   Have you or someone else been injured because of your drinking No   Has anyone been concerned or suggested you cut down on drinking No   TOTAL SCORE - AUDIT 8   Do you use any other substances recreationally? No        Social History     Tobacco Use    Smoking status: Every Day     Current packs/day: 0.50     Average packs/day: 0.5 packs/day for 22.9 years (11.5 ttl pk-yrs)     Types: Cigarettes     Start date: 9/1/2001     Passive exposure: Past    Smokeless tobacco: Never    Tobacco comments:     I know its not good   Vaping Use    Vaping status: Never Used   Substance Use Topics    Alcohol use: Yes     Alcohol/week: 5.0 standard drinks of alcohol    Drug use: Never          Mammogram Screening - Mammogram every 1-2 years updated in Health Maintenance based on mutual decision making        7/30/2024   STI Screening   New sexual partner(s) since last STI/HIV test? No        History of abnormal Pap smear: No - age 30- 64 PAP with HPV every 5 years recommended        Latest Ref Rng & Units 5/22/2023     1:40 PM 2/24/2020     4:48 PM 6/23/2014     2:23 PM   PAP / HPV   PAP   Negative for Intraepithelial Lesion or Malignancy (NILM)  Negative for squamous intraepithelial lesion or malignancy  Electronically signed by Andi Mullins CT (ASCP) on 2/26/2020 at  9:44 AM    Negative for squamous intraepithelial lesion or malignancy  Electronically signed by Antoinette Sheridan CT (ASCP) on 7/8/2014 at 11:23 AM      HPV 16 DNA Negative Negative      HPV 18 DNA Negative Negative      Other HR HPV Negative Negative        ASCVD Risk   The 10-year ASCVD risk score (Sia VIEYRA, et al., 2019) is: 1.9%    Values used to calculate the score:      Age: 44 years      Sex: Female      Is Non- : No      Diabetic: No      Tobacco smoker: Yes      Systolic Blood Pressure: 132 mmHg      Is BP treated: No      HDL Cholesterol: 71 mg/dL      Total Cholesterol: 215 mg/dL       Reviewed and updated as needed this visit by Provider   Tobacco  Allergies  Meds  Problems  Med Hx  Surg Hx  Fam Hx            Past Medical History:   Diagnosis Date    Anxiety     Arthritis 5/12/2023    Benign essential hypertension 03/30/2020    Depression     Depressive disorder 2018    Unsure of depression date    HPV (human papilloma virus) anogenital infection     past history, not active per pt.     Pap Smear (+) Low Grade Squamous Intraepithelial Lesion     Created by Conversion     Recurrent major depressive disorder (H24) 03/30/2020     Past Surgical History:   Procedure Laterality Date    APPENDECTOMY  2012    LAPAROSCOPIC TUBAL LIGATION  09/28/2018    Dr Amalia ANDRES,TUBAL CAUTERY N/A 9/28/2018    Procedure: LAPAROSCOPIC TUBAL LIGATION;  Surgeon: Kaylen Holland MD;  [facility];  Service: Gynecology    WISDOM TOOTH EXTRACTION Bilateral      BP Readings from Last 3 Encounters:   08/02/24 (!) 132/90   05/02/24 (!) 147/99   03/14/24 (!) 148/96    Wt Readings from Last 3 Encounters:   08/02/24 84.5 kg (186 lb 3.2 oz)   05/02/24 85.4 kg (188 lb 6 oz)   03/14/24  83.9 kg (185 lb)                  Patient Active Problem List   Diagnosis    Tobacco abuse    Benign essential hypertension    Hx of abnormal cervical Pap smear    Dysmenorrhea    Abnormal uterine bleeding    Generalized anxiety disorder    Diarrhea, unspecified type    Arthritis    Pain in throat    Elevated blood pressure reading without diagnosis of hypertension    Alcohol use    Severe anxiety    Moderately severe depression     Past Surgical History:   Procedure Laterality Date    APPENDECTOMY  2012    LAPAROSCOPIC TUBAL LIGATION  09/28/2018    Dr Amalia MINA LAP,TUBAL CAUTERY N/A 9/28/2018    Procedure: LAPAROSCOPIC TUBAL LIGATION;  Surgeon: Kaylen Holland MD;  [facility];  Service: Gynecology    WISDOM TOOTH EXTRACTION Bilateral        Social History     Tobacco Use    Smoking status: Every Day     Current packs/day: 0.50     Average packs/day: 0.5 packs/day for 22.9 years (11.5 ttl pk-yrs)     Types: Cigarettes     Start date: 9/1/2001     Passive exposure: Past    Smokeless tobacco: Never    Tobacco comments:     I know its not good   Substance Use Topics    Alcohol use: Yes     Alcohol/week: 5.0 standard drinks of alcohol     Family History   Problem Relation Age of Onset    Cancer Mother         bladder    Other Cancer Mother         Newly diagnosed - bladder    Hyperlipidemia Father     Hypertension Father     Alzheimer Disease Father     Cerebrovascular Disease Maternal Grandmother     Alzheimer Disease Paternal Grandmother     Alzheimer Disease Paternal Grandfather     No Known Problems Brother     Thyroid Disease Sister     Depression No family hx of          Current Outpatient Medications   Medication Sig Dispense Refill    escitalopram (LEXAPRO) 10 MG tablet Take 1 tablet (10 mg) by mouth daily 30 tablet 3    gabapentin (NEURONTIN) 100 MG capsule Take 3 capsules (300 mg) by mouth at bedtime Start with 1 tablet on Day one, 2 on day 2 and then increase to 3 tablets at night time 90  "capsule 3    metroNIDAZOLE (METROCREAM) 0.75 % external cream APPLY TOPICALLY TO FACE TWICE DAILY      Multiple Vitamins-Minerals (WOMENS MULTI GUMMIES PO)            Review of Systems  Constitutional, HEENT, cardiovascular, pulmonary, GI, , musculoskeletal, neuro, skin, endocrine and psych systems are negative, except as otherwise noted.     Objective    Exam  BP (!) 132/90   Pulse 111   Temp 98.6  F (37  C) (Oral)   Resp 14   Ht 1.594 m (5' 2.75\")   Wt 84.5 kg (186 lb 3.2 oz)   LMP 07/22/2024 (Approximate)   SpO2 97%   BMI 33.25 kg/m     Estimated body mass index is 33.25 kg/m  as calculated from the following:    Height as of this encounter: 1.594 m (5' 2.75\").    Weight as of this encounter: 84.5 kg (186 lb 3.2 oz).    Physical Exam  GENERAL: alert and no distress  EYES: Eyes grossly normal to inspection, PERRL and conjunctivae and sclerae normal  HENT: ear canals and TM's normal, nose and mouth without ulcers or lesions  NECK: no adenopathy, no asymmetry, masses, or scars  RESP: lungs clear to auscultation - no rales, rhonchi or wheezes  CV: regular rate and rhythm, normal S1 S2, no S3 or S4, no murmur, click or rub, no peripheral edema  ABDOMEN: soft, nontender, no hepatosplenomegaly, no masses and bowel sounds normal  MS: no gross musculoskeletal defects noted, no edema          12/7/2021     4:24 PM 7/12/2023     5:59 PM 8/2/2024     1:11 PM   PHQ   PHQ-9 Total Score 8 10 20   Q9: Thoughts of better off dead/self-harm past 2 weeks Not at all Not at all Not at all         7/11/2023    10:03 PM 3/13/2024     3:11 PM 8/2/2024     1:11 PM   FLOWER-7 SCORE   Total Score 18 (severe anxiety) 2 (minimal anxiety)    Total Score 18 2 15       PHQ-9 score >10: Likely major depression  Depression score ranges:  5 to 9: mild  10 to 14: moderate  15 to 19: moderately severe  >20: severe    FLOWER & Scoring  0 to 4 points: Minimal or no anxiety   5 to 9 points: Mild anxiety   10 to 14 points: Moderate anxiety   15 to " 21 points: Severe anxiety                Signed Electronically by: Michelet Walls MD

## 2024-08-02 NOTE — PATIENT INSTRUCTIONS
Mental Health referral recommendations:     [facility]:   [facility] [phone]  [address], [place] [address]      [place]:  [place] [facility]: [phone]  [address], [place] [address]     [facility]: [phone]  [address], [place] [address]     [facility]:  [phone]   [address] [place] [address]     [facility]:  [facility] [phone]   [address]     [place]:  [facility]: [phone]  [address]      [place]:  [employer]  [phone]  [address], [place] [address]     [place]:  [place] [facility] [phone]    [address], [facility]  [address]     [facility] /[phone]   [address], [address]     [facility] [phone] [address], [address]     [facility] [phone]   [facility]     Below are resources in case you experience an increase in symptoms or feel you are in crisis:      Acute Emergency / Crisis  If you are having an acute psychiatric emergency, please call 911 or have someone drive you directly to a hospital for further evaluation.     Mental Health Crisis Lines    [facility]:                Adult Crisis Line ([phone])                      Children's Crisis Line ([phone])  [facility]:  Crisis Connection  ([phone])       Urgent Care   [address] [place] [address]   ([phone])   Provides mental health crisis assessments  Walk-in appointments are available     Additional resources  -Local 24 hour Crisis Line: 1-437.538.5986   -National Suicide Prevention Life Line 6-672-559-TALK (1041), www.suicidepreventionlifeline.org  - National [place] of Mental Illness (www.[place].andres.org) 707.509.3780 or 1-672.942.3806  - Suicide Awareness Voices of Education (SAVE) (www.save.org)  5-443-608-SAVE (0166)   - Substance Abuse and Mental Health Services Administration (SAMHSA) www.samhsa.gov 24 hour helpline: 4-387-759 HELP (9443)  - Narcotics Anonymous (www.namim[place].org) 24 hours [facility] Helpline 1-351.907.7923  - [facility] Alcoholic Anonymous [facility] 24 hour phone line 812-647-9568  - Alcoholics Anonymous (www.alcoholics-anonymous.org)  - [place] Recovery Connection ([facility]). [facility] connects people seeking recovery to resources that help foster and sustain long-term recovery. Whether you are seeking resources for treatment, transpiortation, housing, job training, education, health or other pathways to recovery, [facility] is a great place to start. [phone] www.[facility] [employer].org     Health Tips:  - Create a daily schedule  - Eat Healthy  - Do at least one enjoyable activity each day  - Take medications as prescribed  - Get regular sleep at least 8 hours per night  - Practice relaxation  - Spend time with supportive people          Patient Education   Preventive Care Advice   This is general advice given by our system to help you stay healthy. However, your care team may have specific advice just for you. Please talk to your care team about your preventive care needs.  Nutrition  Eat 5 or more servings of fruits and vegetables each day.  Try wheat bread, brown rice and whole grain pasta (instead of white bread, rice, and pasta).  Get enough calcium and vitamin D. Check the label on foods and aim for 100% of the RDA (recommended daily allowance).  Lifestyle  Exercise at least 150 minutes each week  (30 minutes a day, 5 days a week).  Do muscle strengthening activities 2 days a week. These help control your weight and prevent disease.  No smoking.  Wear sunscreen to prevent skin cancer.  Have a dental exam and cleaning every 6 months.  Yearly exams  See your health care team every year to talk about:  Any changes in your health.  Any medicines your care team has  prescribed.  Preventive care, family planning, and ways to prevent chronic diseases.  Shots (vaccines)   HPV shots (up to age 26), if you've never had them before.  Hepatitis B shots (up to age 59), if you've never had them before.  COVID-19 shot: Get this shot when it's due.  Flu shot: Get a flu shot every year.  Tetanus shot: Get a tetanus shot every 10 years.  Pneumococcal, hepatitis A, and RSV shots: Ask your care team if you need these based on your risk.  Shingles shot (for age 50 and up)  General health tests  Diabetes screening:  Starting at age 35, Get screened for diabetes at least every 3 years.  If you are younger than age 35, ask your care team if you should be screened for diabetes.  Cholesterol test: At age 39, start having a cholesterol test every 5 years, or more often if advised.  Bone density scan (DEXA): At age 50, ask your care team if you should have this scan for osteoporosis (brittle bones).  Hepatitis C: Get tested at least once in your life.  STIs (sexually transmitted infections)  Before age 24: Ask your care team if you should be screened for STIs.  After age 24: Get screened for STIs if you're at risk. You are at risk for STIs (including HIV) if:  You are sexually active with more than one person.  You don't use condoms every time.  You or a partner was diagnosed with a sexually transmitted infection.  If you are at risk for HIV, ask about PrEP medicine to prevent HIV.  Get tested for HIV at least once in your life, whether you are at risk for HIV or not.  Cancer screening tests  Cervical cancer screening: If you have a cervix, begin getting regular cervical cancer screening tests starting at age 21.  Breast cancer scan (mammogram): If you've ever had breasts, begin having regular mammograms starting at age 40. This is a scan to check for breast cancer.  Colon cancer screening: It is important to start screening for colon cancer at age 45.  Have a colonoscopy test every 10 years (or more  "often if you're at risk) Or, ask your provider about stool tests like a FIT test every year or Cologuard test every 3 years.  To learn more about your testing options, visit:   .  For help making a decision, visit:   https://alyson.mitch/fi52185.  Prostate cancer screening test: If you have a prostate, ask your care team if a prostate cancer screening test (PSA) at age 55 is right for you.  Lung cancer screening: If you are a current or former smoker ages 50 to 80, ask your care team if ongoing lung cancer screenings are right for you.  For informational purposes only. Not to replace the advice of your health care provider. Copyright   2023 [facility]. All rights reserved. Clinically reviewed by the [facility] Transitions Program. DNA Direct 565325 - REV 01/24.  9 Ways to Cut Back on Drinking  Maybe you've found yourself drinking more alcohol than you'd prefer. If you want to cut back, here are some ideas to try.    Think before you drink.  Do you really want a drink, or is it just a habit? If you're used to having a drink at a certain time, try doing something else then.     Look for substitutes.  Find some no-alcohol drinks that you enjoy, like flavored seltzer water, tea with honey, or tonic with a slice of lime. Or try alcohol-free beer or \"virgin\" cocktails (without the alcohol).     Drink more water.  Use water to quench your thirst. Drink a glass of water before you have any alcohol. Have another glass along with every drink or between drinks.     Shrink your drink.  For example, have a bottle of beer instead of a pint. Use a smaller glass for wine. Choose drinks with lower alcohol content (ABV%). Or use less liquor and more mixer in cocktails.     Slow down.  It's easy to drink quickly and without thinking about it. Pay attention, and make each drink last longer.     Do the math.  Total up how much you spend on alcohol each month. How much is that a year? If you cut back, what could you do " "with the money you save?     Take a break.  Choose a day or two each week when you won't drink at all. Notice how you feel on those days, physically and emotionally. How did you sleep? Do you feel better? Over time, add more break days.     Count calories.  Would you like to lose some weight? For some people that's a good motivator for cutting back. Figure out how many calories are in each drink. How many does that add up to in a day? In a week? In a month?     Practice saying no.  Be ready when someone offers you a drink. Try: \"Thanks, I've had enough.\" Or \"Thanks, but I'm cutting back.\" Or \"No, thanks. I feel better when I drink less.\"   Current as of: November 15, 2023  Content Version: 14.1    2186-5043 [employer].   Care instructions adapted under license by your healthcare professional. If you have questions about a medical condition or this instruction, always ask your healthcare professional. [employer] disclaims any warranty or liability for your use of this information.     "

## 2024-09-17 ENCOUNTER — TELEPHONE (OUTPATIENT)
Dept: FAMILY MEDICINE | Facility: CLINIC | Age: 44
End: 2024-09-17
Payer: COMMERCIAL

## 2024-09-17 NOTE — TELEPHONE ENCOUNTER
Preventive exam form filled out by Dr. Walls. Wondering if pt would like the form emailed to her, mailed or if pt would like to pick it up.    Asked pt to call the clinic back with an answer or send us a [employer] message with an answer.    Form is on play140's desk currently.

## 2024-09-18 ENCOUNTER — MYC MEDICAL ADVICE (OUTPATIENT)
Dept: FAMILY MEDICINE | Facility: CLINIC | Age: 44
End: 2024-09-18
Payer: COMMERCIAL

## 2024-09-27 ENCOUNTER — TRANSFERRED RECORDS (OUTPATIENT)
Dept: HEALTH INFORMATION MANAGEMENT | Facility: CLINIC | Age: 44
End: 2024-09-27
Payer: COMMERCIAL

## 2024-11-08 ENCOUNTER — E-VISIT (OUTPATIENT)
Dept: FAMILY MEDICINE | Facility: CLINIC | Age: 44
End: 2024-11-08
Payer: COMMERCIAL

## 2024-11-08 DIAGNOSIS — F32.A MODERATELY SEVERE DEPRESSION: ICD-10-CM

## 2024-11-08 DIAGNOSIS — F41.9 SEVERE ANXIETY: ICD-10-CM

## 2024-11-08 PROCEDURE — 99421 OL DIG E/M SVC 5-10 MIN: CPT | Performed by: FAMILY MEDICINE

## 2024-11-08 ASSESSMENT — ANXIETY QUESTIONNAIRES
1. FEELING NERVOUS, ANXIOUS, OR ON EDGE: SEVERAL DAYS
GAD7 TOTAL SCORE: 4
2. NOT BEING ABLE TO STOP OR CONTROL WORRYING: SEVERAL DAYS
7. FEELING AFRAID AS IF SOMETHING AWFUL MIGHT HAPPEN: NOT AT ALL
5. BEING SO RESTLESS THAT IT IS HARD TO SIT STILL: NOT AT ALL
6. BECOMING EASILY ANNOYED OR IRRITABLE: NOT AT ALL
GAD7 TOTAL SCORE: 4
3. WORRYING TOO MUCH ABOUT DIFFERENT THINGS: SEVERAL DAYS
IF YOU CHECKED OFF ANY PROBLEMS ON THIS QUESTIONNAIRE, HOW DIFFICULT HAVE THESE PROBLEMS MADE IT FOR YOU TO DO YOUR WORK, TAKE CARE OF THINGS AT HOME, OR GET ALONG WITH OTHER PEOPLE: SOMEWHAT DIFFICULT
7. FEELING AFRAID AS IF SOMETHING AWFUL MIGHT HAPPEN: NOT AT ALL
8. IF YOU CHECKED OFF ANY PROBLEMS, HOW DIFFICULT HAVE THESE MADE IT FOR YOU TO DO YOUR WORK, TAKE CARE OF THINGS AT HOME, OR GET ALONG WITH OTHER PEOPLE?: SOMEWHAT DIFFICULT
GAD7 TOTAL SCORE: 4
4. TROUBLE RELAXING: SEVERAL DAYS

## 2024-11-08 ASSESSMENT — PATIENT HEALTH QUESTIONNAIRE - PHQ9
10. IF YOU CHECKED OFF ANY PROBLEMS, HOW DIFFICULT HAVE THESE PROBLEMS MADE IT FOR YOU TO DO YOUR WORK, TAKE CARE OF THINGS AT HOME, OR GET ALONG WITH OTHER PEOPLE: SOMEWHAT DIFFICULT
SUM OF ALL RESPONSES TO PHQ QUESTIONS 1-9: 7
SUM OF ALL RESPONSES TO PHQ QUESTIONS 1-9: 7

## 2024-11-09 ASSESSMENT — PATIENT HEALTH QUESTIONNAIRE - PHQ9: SUM OF ALL RESPONSES TO PHQ QUESTIONS 1-9: 7

## 2024-11-10 RX ORDER — ESCITALOPRAM OXALATE 20 MG/1
20 TABLET ORAL DAILY
Qty: 90 TABLET | Refills: 3 | Status: SHIPPED | OUTPATIENT
Start: 2024-11-10

## 2024-11-10 NOTE — TELEPHONE ENCOUNTER
Provider E-Visit time total (minutes): 8    \ 7/12/2023     5:59 PM 8/2/2024     1:11 PM 11/8/2024     5:08 PM   PHQ   PHQ-9 Total Score 10 20 7    Q9: Thoughts of better off dead/self-harm past 2 weeks Not at all Not at all Not at all        Patient-reported         3/13/2024     3:11 PM 8/2/2024     1:11 PM 11/8/2024     5:08 PM   FLOWER-7 SCORE   Total Score 2 (minimal anxiety)  4 (minimal anxiety)   Total Score 2 15 4        Patient-reported       Moderately severe depression  Severe anxiety  - escitalopram (LEXAPRO) 20 MG tablet; Take 1 tablet (20 mg) by mouth daily.  Dispense: 90 tablet; Refill: 3

## 2024-12-02 ENCOUNTER — TRANSFERRED RECORDS (OUTPATIENT)
Dept: HEALTH INFORMATION MANAGEMENT | Facility: CLINIC | Age: 44
End: 2024-12-02

## 2024-12-09 ENCOUNTER — TRANSFERRED RECORDS (OUTPATIENT)
Dept: HEALTH INFORMATION MANAGEMENT | Facility: CLINIC | Age: 44
End: 2024-12-09
Payer: COMMERCIAL

## 2025-01-06 ENCOUNTER — TRANSFERRED RECORDS (OUTPATIENT)
Dept: HEALTH INFORMATION MANAGEMENT | Facility: CLINIC | Age: 45
End: 2025-01-06
Payer: COMMERCIAL

## 2025-01-09 ENCOUNTER — ANCILLARY PROCEDURE (OUTPATIENT)
Dept: MAMMOGRAPHY | Facility: CLINIC | Age: 45
End: 2025-01-09
Attending: FAMILY MEDICINE
Payer: COMMERCIAL

## 2025-01-09 DIAGNOSIS — Z12.31 VISIT FOR SCREENING MAMMOGRAM: ICD-10-CM

## 2025-01-09 PROCEDURE — 77063 BREAST TOMOSYNTHESIS BI: CPT

## 2025-01-24 ENCOUNTER — TRANSFERRED RECORDS (OUTPATIENT)
Dept: HEALTH INFORMATION MANAGEMENT | Facility: CLINIC | Age: 45
End: 2025-01-24
Payer: COMMERCIAL

## 2025-01-28 ENCOUNTER — MYC MEDICAL ADVICE (OUTPATIENT)
Dept: FAMILY MEDICINE | Facility: CLINIC | Age: 45
End: 2025-01-28
Payer: COMMERCIAL

## 2025-01-28 DIAGNOSIS — M51.369 DEGENERATION OF INTERVERTEBRAL DISC OF LUMBAR REGION, UNSPECIFIED WHETHER PAIN PRESENT: Primary | ICD-10-CM

## 2025-01-29 NOTE — TELEPHONE ENCOUNTER
Degeneration of intervertebral disc of lumbar region, unspecified whether pain present (Primary)  - Adult Neurosurgery  Referral; Future     Mary Reyez, DO

## 2025-02-04 ENCOUNTER — TELEPHONE (OUTPATIENT)
Dept: NEUROSURGERY | Facility: CLINIC | Age: 45
End: 2025-02-04
Payer: COMMERCIAL

## 2025-02-04 DIAGNOSIS — M54.16 LUMBAR RADICULOPATHY: Primary | ICD-10-CM

## 2025-02-04 NOTE — TELEPHONE ENCOUNTER
----- Message from Kimi ARCE sent at 2/4/2025  2:23 PM CST -----  Regarding: XR prior - appt tomorrow  Hello,    This is a late-add on patient tomorrow for Dr. Pedraza, per Lucinda's review, please schedule patient for XR prior to appointment tomorrow. Order placed today.    Thank you!  Kimi Stevens note - If patient asks if we received her MRI from [facility], we did! Thanks!

## 2025-02-04 NOTE — TELEPHONE ENCOUNTER
Date: February 4, 2025    Provider: Other     Provider/Other: Radiology Department     Reason for out-going call: ORDERS: CASE REQUEST/IMAGING/ INJECTION/ PROCEDURES       Detailed message: Left message to call imaging department to set up XR prior to appointment tomorrow with Dr. Pedraza. Patient schedule at any location available to them.           Number provider for patient: [facility]: [phone]

## 2025-02-04 NOTE — CONFIDENTIAL NOTE
NEUROSURGERY - NEW PREVISIT PLANNING    Referring Provider:   Mary Reyez, DO      OVN 1/28/2025   Reason For Visit: M51.369 (ICD-10-CM) - Degeneration of intervertebral disc of lumbar region, unspecified whether pain present        IMAGING STATUS/LOCATION DATE/TYPE   MRI PACS, report scanned into [facility] 12/02/2024  Lumbar  [place]   CT N/A    XRAY *pending scheduling    NOTES STATUS/LOCATION DATE/TYPE   Other specialist OVN: Media Scan / [place] 01/28/2025   EMG N/A    INJECTION Media Scan / [place] 01/06/2025, left L5 TFESI   PHYSICAL THERAPY N/A    SURGERY N/A

## 2025-02-04 NOTE — CONFIDENTIAL NOTE
Patient is late add-on. Urgent stat requests have been made for records. Received lumbar MRI and report, scanned into [facility]. Waiting on procedure report of recent injection, the injection is described in [facility] scan.    RECEIVED. Image in PACS, records scanned to media.

## 2025-02-05 ENCOUNTER — PRE VISIT (OUTPATIENT)
Dept: NEUROSURGERY | Facility: CLINIC | Age: 45
End: 2025-02-05

## 2025-02-05 ENCOUNTER — HOSPITAL ENCOUNTER (OUTPATIENT)
Dept: GENERAL RADIOLOGY | Facility: HOSPITAL | Age: 45
Discharge: HOME | End: 2025-02-05
Attending: STUDENT IN AN ORGANIZED HEALTH CARE EDUCATION/TRAINING PROGRAM
Payer: COMMERCIAL

## 2025-02-05 ENCOUNTER — TELEPHONE (OUTPATIENT)
Dept: NEUROLOGY | Facility: CLINIC | Age: 45
End: 2025-02-05

## 2025-02-05 ENCOUNTER — OFFICE VISIT (OUTPATIENT)
Dept: NEUROSURGERY | Facility: CLINIC | Age: 45
End: 2025-02-05
Attending: FAMILY MEDICINE
Payer: COMMERCIAL

## 2025-02-05 VITALS
DIASTOLIC BLOOD PRESSURE: 111 MMHG | WEIGHT: 193.5 LBS | OXYGEN SATURATION: 96 % | HEART RATE: 73 BPM | SYSTOLIC BLOOD PRESSURE: 174 MMHG | BODY MASS INDEX: 34.29 KG/M2 | HEIGHT: 63 IN

## 2025-02-05 DIAGNOSIS — M51.369 DEGENERATION OF INTERVERTEBRAL DISC OF LUMBAR REGION, UNSPECIFIED WHETHER PAIN PRESENT: ICD-10-CM

## 2025-02-05 DIAGNOSIS — M54.16 LUMBAR RADICULOPATHY: ICD-10-CM

## 2025-02-05 DIAGNOSIS — G57.02 PIRIFORMIS SYNDROME, LEFT: Primary | ICD-10-CM

## 2025-02-05 PROCEDURE — 72120 X-RAY BEND ONLY L-S SPINE: CPT

## 2025-02-05 ASSESSMENT — PAIN SCALES - GENERAL: PAINLEVEL_OUTOF10: MODERATE PAIN (5)

## 2025-02-05 NOTE — LETTER
2/5/2025      Emily Vidales  [address] [place] [id number]      Dear Colleague,    Thank you for referring your patient, Emily Vidales, to the [facility]. Please see a copy of my visit note below.    Dear Dr. Mary Reyez,     Thank you for the opportunity to participate in the care of Emily Vidales.    As you know, she is a 44-year-old female who is presenting with concerns of left-sided hip, knee, and foot pain.  She presents to clinic for a second opinion. She previously was recommended a possible Intercept implant versus L5-S fusion.     When asked to describe the dermatomal location of her pain based on the image, the patient states that her pain is actually multi dermatomal.  Although if she had to choose 1 distribution, she states that she would choose left L5.  She has undergone rather extensive workup with hip and knee imaging.    She has only tried a cortisone injection at L5-S1 on the left with only half a day of pain relief.  She denies any relief thereafter.  She does feel numbness as well.  She has not tried physical therapy.  Her main mode of pain relief at this point is heat and Salonpas pads.  She has also tried prescription Aleve.  Her pain is constant.  At rest, she has 4-5 out of 10 pain.  With activity, her pain is 7-8 out of 10.  She has not performed physical therapy.  Her pain started 1 year prior.    On exam, she has 5 out of 5 leg strength in bilateral lower extremities.  She has no hyperreflexia.  She has no sensory deficits in the lower extremities.  Her Stephen finger test was negative.  She has a negative compression test and negative thigh thrust.  She has a negative BECKI test.    Her MR imaging was reviewed from 12/2/24.  She has Modic changes at L5-S1.  She has no central canal or neuroforaminal stenosis.    Her XR L spine was unremarkable for dynamic instability.     In assessment, Emily has an unclear etiology of her left leg pain.  She has no clear  "neuroforaminal stenosis or central canal stenosis radiographically to support surgical decompression or stabilization on her spine.  I reviewed her MRI lumbar spine to explain this rationale. She also does not have any findings concerning for sacroiliac dysfunction.    Other diagnoses on the differential include piriformis syndrome or primary neuropathy. I stated that the next step would be to trial gabapentin and to also perform a EMG/NCS. Depending on the EMG/NCS study, she may benefit from referral to a physical therapist.     Tim \"Ellis\" MD Keila    of Neurosurgery, [facility]      Again, thank you for allowing me to participate in the care of your patient.        Sincerely,        Tim Pedraza MD    Electronically signed"

## 2025-02-05 NOTE — PROGRESS NOTES
Dear Dr. Mary Reyez,     Thank you for the opportunity to participate in the care of Emily Vidales.    As you know, she is a 44-year-old female who is presenting with concerns of left-sided hip, knee, and foot pain.  She presents to clinic for a second opinion. She previously was recommended a possible Intercept implant versus L5-S fusion.     When asked to describe the dermatomal location of her pain based on the image, the patient states that her pain is actually multi dermatomal.  Although if she had to choose 1 distribution, she states that she would choose left L5.  She has undergone rather extensive workup with hip and knee imaging.    She has only tried a cortisone injection at L5-S1 on the left with only half a day of pain relief.  She denies any relief thereafter.  She does feel numbness as well.  She has not tried physical therapy.  Her main mode of pain relief at this point is heat and Salonpas pads.  She has also tried prescription Aleve.  Her pain is constant.  At rest, she has 4-5 out of 10 pain.  With activity, her pain is 7-8 out of 10.  She has not performed physical therapy.  Her pain started 1 year prior.    On exam, she has 5 out of 5 leg strength in bilateral lower extremities.  She has no hyperreflexia.  She has no sensory deficits in the lower extremities.  Her Stephen finger test was negative.  She has a negative compression test and negative thigh thrust.  She has a negative BECKI test.    Her MR imaging was reviewed from 12/2/24.  She has Modic changes at L5-S1.  She has no central canal or neuroforaminal stenosis.    Her XR L spine was unremarkable for dynamic instability.     In assessment, Emily has an unclear etiology of her left leg pain.  She has no clear neuroforaminal stenosis or central canal stenosis radiographically to support surgical decompression or stabilization on her spine.  I reviewed her MRI lumbar spine to explain this rationale. She also does not have any findings  "concerning for sacroiliac dysfunction.    Other diagnoses on the differential include piriformis syndrome or primary neuropathy. I stated that the next step would be to trial gabapentin and to also perform a EMG/NCS. Depending on the EMG/NCS study, she may benefit from referral to a physical therapist.     Tim \"Ellis\" MD Keila    of Neurosurgery, [facility]    "

## 2025-02-05 NOTE — TELEPHONE ENCOUNTER
"MRI L spine was reviewed with Dr. Brice. We discussed the possibility of piriformis syndrome and the potential to perform a LEFT sided ultrasound injection. Thus, I will place an order for this. This was reviewed with Ms. Vidales, and she is amenable to this procedure. If she derives clinical benefit, then the EMG/NCS would no longer be warranted.     Tim \"Ellis\" MD Keila    of Neurosurgery, [facility]    "

## 2025-02-05 NOTE — NURSING NOTE
"Emily Vidales is a 44 year old female who presents for:  Chief Complaint   Patient presents with    Consult     Patient has pain in her lower back, when standing it will run down the left leg to the toes. Left hip and foot have numbness in them. Lvl 5.        Initial Vitals:  BP (!) 174/111   Pulse 73   Ht 5' 3\" (1.6 m)   Wt 193 lb 8 oz (87.8 kg)   SpO2 96%   BMI 34.28 kg/m   Estimated body mass index is 34.28 kg/m  as calculated from the following:    Height as of this encounter: 5' 3\" (1.6 m).    Weight as of this encounter: 193 lb 8 oz (87.8 kg).. Body surface area is 1.98 meters squared. BP completed using cuff size: regular  Moderate Pain (5)    Nursing Comments:       Ruth Whitten    "

## 2025-02-26 ENCOUNTER — OFFICE VISIT (OUTPATIENT)
Dept: PHYSICAL MEDICINE AND REHAB | Facility: CLINIC | Age: 45
End: 2025-02-26
Attending: STUDENT IN AN ORGANIZED HEALTH CARE EDUCATION/TRAINING PROGRAM
Payer: COMMERCIAL

## 2025-02-26 DIAGNOSIS — M54.16 LUMBAR RADICULAR PAIN: Primary | ICD-10-CM

## 2025-02-26 DIAGNOSIS — G57.02 PIRIFORMIS SYNDROME, LEFT: ICD-10-CM

## 2025-02-26 DIAGNOSIS — M51.369 DEGENERATION OF INTERVERTEBRAL DISC OF LUMBAR REGION, UNSPECIFIED WHETHER PAIN PRESENT: ICD-10-CM

## 2025-02-26 PROCEDURE — 95909 NRV CNDJ TST 5-6 STUDIES: CPT | Performed by: PHYSICAL MEDICINE & REHABILITATION

## 2025-02-26 PROCEDURE — 95886 MUSC TEST DONE W/N TEST COMP: CPT | Performed by: PHYSICAL MEDICINE & REHABILITATION

## 2025-02-26 NOTE — PATIENT INSTRUCTIONS
Thank you for choosing the [facility] for your EMG testing.    The ordering provider will receive your final EMG results within the next few days.  Please follow up with your provider for the results and further treatment recommendations.

## 2025-02-26 NOTE — PROGRESS NOTES
[facility]  [address] [place] [address] [address]  [place] [address]  Office: [phone] Fax: [phone]    Electromyography and Nerve Conduction Study Report        Indication: Patient presents at the request of Dr. Pedraza for left lower extremity EMG.  She has low back pain without lower extremity paresthesias and a sense of weakness in the left leg to the medial malleolus or dorsal left foot to the great toe.  On exam, normal sensation to light touch through the lower extremities, 2+ very brisk  Patellar and Achilles reflexes bilaterally with downgoing toes, normal muscle strength throughout the major muscle groups of the bilateral lower extremities.        Pt Exam Discussion (Communication Barriers):  Electromyography and nerve conduction testing, including associated discomfort, risks, benefits, and alternatives was discussed with the patient prior to the procedure.  No learning/ communication barriers; patient verbalized understanding of procedure.  Informed consent was obtained.           Pt Assessment:  Testing was successfully completed; patient tolerated testing well.       Blood Thinners: None Skin Temperature: Warmed 33.2                   EMG/NCS  results:     Nerve Conduction Studies  Motor Sites      Segment Distal Latency Neg. Amp CV F-Latency F-Estimate Comment   Site  (ms) (mV) (m/s) (ms) (ms)    Left Fibular (EDB) Motor   Ankle Ankle-EDB 3.5 9.9       Knee Knee-Ankle 9.6 8.7 59      Left Tibial (AH) Motor   Ankle Ankle-AH 3.1 14.3       Knee Knee-Ankle 10.1 8.1 55        Sensory Sites      Onset Lat Peak Lat Amp CV Comment   Site (ms) (ms) ( V) (m/s)    Left Sural Sensory   B-Ankle 2.5 3.2 15 -      H-Reflex Sites      M-Lat H Lat H Neg Amp   Site (ms) (ms) (mV)   Left Tibial (Soleus) H-Reflex   Pop Fossa 5.7 27.2 3.2   Right Tibial (Soleus) H-Reflex   Pop Fossa 5.1 27.8 1.39     H-Reflex Sites      Lt. H Lat Rt. H Lat L-R H Lat L-R H Neg Amp   Site (ms) (ms) (ms) Norm  (mV)   Tibial (Soleus) H-Reflex   Pop Fossa 27.2 27.8 0.60  < 3.0 1.81       NCS Waveforms:    Motor         Sensory       H-Reflex           Electromyography     Side Muscle Nerve Root Ins Act Fibs Psw Fasc Recrt Dur Amp Poly Comment   Left AntTibialis Dp Br Fibular L4-5 Nml Nml Nml Nml Nml Nml Nml 0    Left Gastroc Tibial S1-2 Nml Nml Nml Nml Nml Nml Nml 0    Left Fibularis Long Sup Br Fibular L5-S1 Nml Nml Nml Nml Nml Nml Nml 0    Left VastusLat Femoral L2-4 Nml Nml Nml Nml Nml Nml Nml 0    Left TensorFascLat SupGluteal L4-5, S1 Nml Nml Nml Nml Nml Nml Nml 0          Comment NCS: Normal study  1.  Normal nerve conduction studies left lower extremity.  This includes normal left sural SNAP, peroneal and tibial CMAP's, and symmetric tibial H reflexes.    Comment EMG: Normal study  1.  Normal needle EMG left lower extremity.    Interpretation: Normal study    1. There is no electrodiagnostic evidence of lumbosacral radiculopathy, lumbosacral plexopathy, or focal neuropathy in the left lower extremity.  Specifically, there is no electrodiagnostic evidence of a left L4/5 radiculopathy.    The testing was completed in its entirety by the physician.      It was our pleasure caring for your patient today, if there any questions or concerns please do not hesitate to contact us.

## 2025-02-26 NOTE — LETTER
2/26/2025      Emily Vidales  [address] [place] [id number]      Dear Colleague,    Thank you for referring your patient, Emily Vidales, to the [facility]UR[facility]. Please see a copy of my visit note below.    [facility]  [address] [place] [id number]  Office: [phone] Fax: [phone]    Electromyography and Nerve Conduction Study Report        Indication: Patient presents at the request of Dr. Pedraza for left lower extremity EMG.  She has low back pain without lower extremity paresthesias and a sense of weakness in the left leg to the medial malleolus or dorsal left foot to the great toe.  On exam, normal sensation to light touch through the lower extremities, 2+ very brisk  Patellar and Achilles reflexes bilaterally with downgoing toes, normal muscle strength throughout the major muscle groups of the bilateral lower extremities.        Pt Exam Discussion (Communication Barriers):  Electromyography and nerve conduction testing, including associated discomfort, risks, benefits, and alternatives was discussed with the patient prior to the procedure.  No learning/ communication barriers; patient verbalized understanding of procedure.  Informed consent was obtained.           Pt Assessment:  Testing was successfully completed; patient tolerated testing well.       Blood Thinners: None Skin Temperature: Warmed 33.2                   EMG/NCS  results:     Nerve Conduction Studies  Motor Sites      Segment Distal Latency Neg. Amp CV F-Latency F-Estimate Comment   Site  (ms) (mV) (m/s) (ms) (ms)    Left Fibular (EDB) Motor   Ankle Ankle-EDB 3.5 9.9       Knee Knee-Ankle 9.6 8.7 59      Left Tibial (AH) Motor   Ankle Ankle-AH 3.1 14.3       Knee Knee-Ankle 10.1 8.1 55        Sensory Sites      Onset Lat Peak Lat Amp CV Comment   Site (ms) (ms) ( V) (m/s)    Left Sural Sensory   B-Ankle 2.5 3.2 15 -      H-Reflex Sites      M-Lat H Lat H Neg Amp    Site (ms) (ms) (mV)   Left Tibial (Soleus) H-Reflex   Pop Fossa 5.7 27.2 3.2   Right Tibial (Soleus) H-Reflex   Pop Fossa 5.1 27.8 1.39     H-Reflex Sites      Lt. H Lat Rt. H Lat L-R H Lat L-R H Neg Amp   Site (ms) (ms) (ms) Norm (mV)   Tibial (Soleus) H-Reflex   Pop Fossa 27.2 27.8 0.60  < 3.0 1.81       NCS Waveforms:    Motor         Sensory       H-Reflex           Electromyography     Side Muscle Nerve Root Ins Act Fibs Psw Fasc Recrt Dur Amp Poly Comment   Left AntTibialis Dp Br Fibular L4-5 Nml Nml Nml Nml Nml Nml Nml 0    Left Gastroc Tibial S1-2 Nml Nml Nml Nml Nml Nml Nml 0    Left Fibularis Long Sup Br Fibular L5-S1 Nml Nml Nml Nml Nml Nml Nml 0    Left VastusLat Femoral L2-4 Nml Nml Nml Nml Nml Nml Nml 0    Left TensorFascLat SupGluteal L4-5, S1 Nml Nml Nml Nml Nml Nml Nml 0          Comment NCS: Normal study  1.  Normal nerve conduction studies left lower extremity.  This includes normal left sural SNAP, peroneal and tibial CMAP's, and symmetric tibial H reflexes.    Comment EMG: Normal study  1.  Normal needle EMG left lower extremity.    Interpretation: Normal study    1. There is no electrodiagnostic evidence of lumbosacral radiculopathy, lumbosacral plexopathy, or focal neuropathy in the left lower extremity.  Specifically, there is no electrodiagnostic evidence of a left L4/5 radiculopathy.    The testing was completed in its entirety by the physician.      It was our pleasure caring for your patient today, if there any questions or concerns please do not hesitate to contact us.      Again, thank you for allowing me to participate in the care of your patient.        Sincerely,        Siva Espinal, DO    Electronically signed

## 2025-07-21 ENCOUNTER — PATIENT OUTREACH (OUTPATIENT)
Dept: CARE COORDINATION | Facility: CLINIC | Age: 45
End: 2025-07-21
Payer: COMMERCIAL